# Patient Record
Sex: MALE | Race: BLACK OR AFRICAN AMERICAN | NOT HISPANIC OR LATINO | Employment: FULL TIME | URBAN - METROPOLITAN AREA
[De-identification: names, ages, dates, MRNs, and addresses within clinical notes are randomized per-mention and may not be internally consistent; named-entity substitution may affect disease eponyms.]

---

## 2018-08-21 DIAGNOSIS — Q24.9 ADULT CONGENITAL HEART DISEASE: Primary | ICD-10-CM

## 2018-10-24 ENCOUNTER — OFFICE VISIT (OUTPATIENT)
Dept: INTERNAL MEDICINE | Facility: CLINIC | Age: 28
End: 2018-10-24
Payer: COMMERCIAL

## 2018-10-24 VITALS
HEIGHT: 72 IN | DIASTOLIC BLOOD PRESSURE: 65 MMHG | HEART RATE: 54 BPM | SYSTOLIC BLOOD PRESSURE: 115 MMHG | BODY MASS INDEX: 26.61 KG/M2 | WEIGHT: 196.44 LBS | OXYGEN SATURATION: 94 %

## 2018-10-24 DIAGNOSIS — M25.511 CHRONIC RIGHT SHOULDER PAIN: ICD-10-CM

## 2018-10-24 DIAGNOSIS — M72.2 PLANTAR FASCIITIS OF RIGHT FOOT: Primary | ICD-10-CM

## 2018-10-24 DIAGNOSIS — G89.29 CHRONIC RIGHT SHOULDER PAIN: ICD-10-CM

## 2018-10-24 PROCEDURE — 99999 PR PBB SHADOW E&M-EST. PATIENT-LVL IV: CPT | Mod: PBBFAC,,, | Performed by: STUDENT IN AN ORGANIZED HEALTH CARE EDUCATION/TRAINING PROGRAM

## 2018-10-24 PROCEDURE — 3078F DIAST BP <80 MM HG: CPT | Mod: CPTII,S$GLB,, | Performed by: STUDENT IN AN ORGANIZED HEALTH CARE EDUCATION/TRAINING PROGRAM

## 2018-10-24 PROCEDURE — 99213 OFFICE O/P EST LOW 20 MIN: CPT | Mod: S$GLB,,, | Performed by: STUDENT IN AN ORGANIZED HEALTH CARE EDUCATION/TRAINING PROGRAM

## 2018-10-24 PROCEDURE — 3008F BODY MASS INDEX DOCD: CPT | Mod: CPTII,S$GLB,, | Performed by: STUDENT IN AN ORGANIZED HEALTH CARE EDUCATION/TRAINING PROGRAM

## 2018-10-24 PROCEDURE — 3074F SYST BP LT 130 MM HG: CPT | Mod: CPTII,S$GLB,, | Performed by: STUDENT IN AN ORGANIZED HEALTH CARE EDUCATION/TRAINING PROGRAM

## 2018-10-24 RX ORDER — IBUPROFEN 600 MG/1
600 TABLET ORAL EVERY 6 HOURS PRN
Qty: 15 TABLET | Refills: 0 | Status: SHIPPED | OUTPATIENT
Start: 2018-10-24 | End: 2018-10-29

## 2018-10-24 NOTE — PROGRESS NOTES
Subjective:       Patient ID: Zara Iniguez is a 28 y.o. male.    Chief Complaint: Foot Pain and Shoulder Pain    This is a 29 y/o male with hx of VSD s/p repair in childhood, presents today for urgent visit c/o right heel/shoulder pain.    Right heel pain:  - started 3 months ago, more pressure like at the back of the heel, increased with activity and relived by rest. He has tried hot pads that partially helped. Reports that he recently started playing basket ball. He never had this pain before. Denies any trauma, change in color, joint swelling or deformity.    Right should pain:  - Old complaints (been their for years) he can only describes it as irritation, their is no certain movement that would elicit the pain. He usually play basket ball mainly with right hand.      He was seen by Dr. Fuller in cardiology in 2016, has an appointment with her next month.            Review of Systems   Constitutional: Negative for chills, fatigue and fever.   HENT: Negative for congestion and rhinorrhea.    Eyes: Negative for visual disturbance.   Respiratory: Negative for cough and shortness of breath.    Cardiovascular: Negative for chest pain, palpitations and leg swelling.   Gastrointestinal: Negative for abdominal pain, constipation, diarrhea, nausea and vomiting.   Genitourinary: Negative for dysuria and hematuria.   Musculoskeletal: Negative for arthralgias and joint swelling.   Skin: Negative for rash.   Neurological: Negative for light-headedness and headaches.   Hematological: Does not bruise/bleed easily.       Objective:      Physical Exam   Constitutional: He is oriented to person, place, and time. He appears well-developed and well-nourished.   HENT:   Head: Normocephalic and atraumatic.   Neck: Normal range of motion. Neck supple.   Cardiovascular: Normal rate, regular rhythm and normal heart sounds.   Pulmonary/Chest: Effort normal and breath sounds normal.   Abdominal: Soft. Bowel sounds are normal.    Musculoskeletal: Normal range of motion. He exhibits tenderness (Mid-heel with deep pressure ). He exhibits no edema or deformity.   Neurological: He is alert and oriented to person, place, and time.   Skin: Skin is warm and dry.   Psychiatric: He has a normal mood and affect. His behavior is normal.       Assessment:       1. Plantar fasciitis of right foot    2. Chronic right shoulder pain        Plan:     1- Plantar fasciitis of right would be one of other differntial such as heel contusion, would recommend treatment with short course of NSAID with ice packs and muscle stretching before exercise. Also, importance of wearing comfortable footwear.    2- Shoulder pain, pt has Full ROM, advised to stretching before physical exorcise, monitor the irritation and come back if sometimes worsen.    3- Repaired VSD, has an appointment with cardiology in 11/2018.    4- he is up to date with his vaccination.      RTC prn, 1 year.            Rosalba Alvarez MD  Internal Medicine  PGY-2

## 2018-10-24 NOTE — PATIENT INSTRUCTIONS
Ankle Dorsiflexion/Plantarflexion (Flexibility)    1. Sit on the floor or in bed with your legs straight in front of you.  2. Point both feet. Then flex both feet.  3. Do this 10 to 30 times in a row.  4. Repeat this exercise 2 times a day, or as instructed.  Date Last Reviewed: 5/1/2016 © 2000-2017 [employer]. [address] [place] [address]. All rights reserved. This information is not intended as a substitute for professional medical care. Always follow your healthcare professional's instructions.        Plantar Fasciitis  Plantar fasciitis is a painful swelling of the plantar fascia. The plantar fascia is a thick, fibrous layer of tissue. It covers the bones on the bottom of your foot. And it supports the foot bones in an arched position.  This can happen gradually or suddenly. It usually affects one foot at a time. Heel pain can be sharp, like a knife sticking into the bottom of your foot. You may feel pain after exercising, long-distance jogging, stair climbing, long periods of standing, or after standing up.  Risk factors include: non-active lifestyle, arthritis, diabetes, obesity or recent weight gain, flat foot, high arch. Wearing high heels, loose shoes, or shoes with poor arch support for long periods of time adds to the risk. This problem is commonly found in runners and dancers. It also found in people who stand on hard surfaces for long periods of time.  Foot pain from this condition is usually worse in the morning. But it improves with walking. By the end of the day there may be a dull aching. Treatment requires short-term rest and controlling swelling. It may take up to 9 months before all symptoms go away. Rarely, a steroid injection into the foot, or surgery, may be needed.  Home care  · If you are overweight, lose weight to help healing.  · Choose supportive shoes with good arch support and shock absorbency. Replace athletic shoes when they become worn out. Dont walk  or run barefoot.  · Premade or custom-fitted shoe inserts may be helpful. Inserts made of silicone seem to be the most effective. Custom-made inserts can be provided by a podiatrist or foot specialist, physical therapist, or orthopedist.  · Premade or custom-made night splints keep the heel stretched out while you sleep. They may prevent morning pain.  · Avoid activities that stress the feet: jogging, prolonged standing or walking, contact sports, etc.  · First thing in the morning and before sports, stretch the bottom of your feet. Gently flex your ankle so the toes move toward your knee.  · Icing may help control heel pain. Apply an ice pack to the heel for 10-20 minutes as a preventive. Or ice your heel after a severe flare-up of symptoms. You may repeat this every 1-2 hours as needed.  · You may use over-the-counter pain medicine to control pain, unless another medicine was prescribed. Anti-inflammatory pain medicines, such as ibuprofen or naproxen, may work better than acetaminophen. If you have chronic liver or kidney disease or ever had a stomach ulcer or GI bleeding, talk with your healthcare provider before using these medicines.  Follow-up care  Follow up with your healthcare provider, physical therapist, or podiatrist or foot specialist as advised.  Call for an appointment if pain worsens or there is no relief after a few weeks of home treatment. Shoe inserts, a night splint, or a special boot may be required.  If X-rays were taken, you will be told of any new findings that may affect your care.  When to seek medical advice  Call your healthcare provider right away if any of these occur:  · Foot swelling  · Redness with increasing pain  Date Last Reviewed: 11/21/2015  © 5251-2415 [employer]. [address] [place] [address], [place] [address]. All rights reserved. This information is not intended as a substitute for professional medical care. Always follow your healthcare professional's  instructions.

## 2018-10-31 NOTE — PROGRESS NOTES
Preceptor note    Patient's history and physical discussed, please refer to resident physician's note for specific details. Pt seen and examined with resident physician. Medical record reviewed. I agree with resident's assessment and plan. In addition, advised patient to make sure he wears shoes with heel and arch support given his job requires prolonged standing.

## 2018-11-15 ENCOUNTER — OFFICE VISIT (OUTPATIENT)
Dept: CARDIOLOGY | Facility: CLINIC | Age: 28
End: 2018-11-15
Payer: COMMERCIAL

## 2018-11-15 ENCOUNTER — HOSPITAL ENCOUNTER (OUTPATIENT)
Dept: CARDIOLOGY | Facility: CLINIC | Age: 28
Discharge: HOME | End: 2018-11-15
Attending: INTERNAL MEDICINE
Payer: COMMERCIAL

## 2018-11-15 ENCOUNTER — LAB VISIT (OUTPATIENT)
Dept: LAB | Facility: HOSPITAL | Age: 28
End: 2018-11-15
Attending: INTERNAL MEDICINE
Payer: COMMERCIAL

## 2018-11-15 ENCOUNTER — HOSPITAL ENCOUNTER (OUTPATIENT)
Dept: CARDIOLOGY | Facility: CLINIC | Age: 28
Discharge: HOME | End: 2018-11-15
Payer: COMMERCIAL

## 2018-11-15 VITALS
BODY MASS INDEX: 26.82 KG/M2 | SYSTOLIC BLOOD PRESSURE: 117 MMHG | DIASTOLIC BLOOD PRESSURE: 73 MMHG | HEART RATE: 56 BPM | WEIGHT: 198 LBS | HEIGHT: 72 IN | OXYGEN SATURATION: 99 %

## 2018-11-15 VITALS — HEART RATE: 60 BPM

## 2018-11-15 DIAGNOSIS — Q24.9 ADULT CONGENITAL HEART DISEASE: Chronic | ICD-10-CM

## 2018-11-15 DIAGNOSIS — Q21.20 AV CANAL: ICD-10-CM

## 2018-11-15 DIAGNOSIS — Q24.9 ADULT CONGENITAL HEART DISEASE: ICD-10-CM

## 2018-11-15 LAB
ALBUMIN SERPL BCP-MCNC: 4.1 G/DL
ALP SERPL-CCNC: 54 U/L
ALT SERPL W/O P-5'-P-CCNC: 27 U/L
ANION GAP SERPL CALC-SCNC: 7 MMOL/L
ASCENDING AORTA: 3.54 CM
AST SERPL-CCNC: 23 U/L
AV MEAN GRADIENT: 2.69 MMHG
AV PEAK GRADIENT: 3.92 MMHG
AV VALVE AREA: 3.73 CM2
BASOPHILS # BLD AUTO: 0.04 K/UL
BASOPHILS NFR BLD: 0.8 %
BILIRUB SERPL-MCNC: 0.7 MG/DL
BNP SERPL-MCNC: 24 PG/ML
BUN SERPL-MCNC: 9 MG/DL
CALCIUM SERPL-MCNC: 8.9 MG/DL
CHLORIDE SERPL-SCNC: 103 MMOL/L
CHOLEST SERPL-MCNC: 185 MG/DL
CHOLEST/HDLC SERPL: 4.7 {RATIO}
CO2 SERPL-SCNC: 29 MMOL/L
CREAT SERPL-MCNC: 0.9 MG/DL
CV ECHO LV RWT: 0.45 CM
DIFFERENTIAL METHOD: NORMAL
DOP CALC AO PEAK VEL: 0.99 M/S
DOP CALC AO VTI: 24.91 CM
DOP CALC LVOT AREA: 4.19 CM2
DOP CALC LVOT DIAMETER: 2.31 CM
DOP CALC LVOT STROKE VOLUME: 92.99 CM3
DOP CALCLVOT PEAK VEL VTI: 22.2 CM
E WAVE DECELERATION TIME: 237.14 MSEC
E/A RATIO: 1.63
E/E' RATIO: 7.92
ECHO LV POSTERIOR WALL: 1.02 CM (ref 0.6–1.1)
EOSINOPHIL # BLD AUTO: 0 K/UL
EOSINOPHIL NFR BLD: 0.6 %
ERYTHROCYTE [DISTWIDTH] IN BLOOD BY AUTOMATED COUNT: 13 %
EST. GFR  (AFRICAN AMERICAN): >60 ML/MIN/1.73 M^2
EST. GFR  (NON AFRICAN AMERICAN): >60 ML/MIN/1.73 M^2
FRACTIONAL SHORTENING: 28 % (ref 28–44)
GLUCOSE SERPL-MCNC: 87 MG/DL
HCT VFR BLD AUTO: 46.2 %
HDLC SERPL-MCNC: 39 MG/DL
HDLC SERPL: 21.1 %
HGB BLD-MCNC: 15 G/DL
IMM GRANULOCYTES # BLD AUTO: 0.01 K/UL
IMM GRANULOCYTES NFR BLD AUTO: 0.2 %
INTERVENTRICULAR SEPTUM: 1 CM (ref 0.6–1.1)
LA MAJOR: 5.01 CM
LA MINOR: 4.64 CM
LA WIDTH: 3.29 CM
LDLC SERPL CALC-MCNC: 132.8 MG/DL
LEFT ATRIUM SIZE: 3.35 CM
LEFT ATRIUM VOLUME: 45.14 CM3
LEFT INTERNAL DIMENSION IN SYSTOLE: 3.3 CM (ref 2.1–4)
LEFT VENTRICLE DIASTOLIC VOLUME: 95.51 ML
LEFT VENTRICLE SYSTOLIC VOLUME: 44.19 ML
LEFT VENTRICULAR INTERNAL DIMENSION IN DIASTOLE: 4.56 CM (ref 3.5–6)
LEFT VENTRICULAR MASS: 158.74 G
LV LATERAL E/E' RATIO: 6.87
LV SEPTAL E/E' RATIO: 9.36
LYMPHOCYTES # BLD AUTO: 1.5 K/UL
LYMPHOCYTES NFR BLD: 28.9 %
MCH RBC QN AUTO: 30 PG
MCHC RBC AUTO-ENTMCNC: 32.5 G/DL
MCV RBC AUTO: 92 FL
MONOCYTES # BLD AUTO: 0.6 K/UL
MONOCYTES NFR BLD: 10.7 %
MV PEAK A VEL: 0.63 M/S
MV PEAK E VEL: 1.03 M/S
NEUTROPHILS # BLD AUTO: 3 K/UL
NEUTROPHILS NFR BLD: 58.8 %
NONHDLC SERPL-MCNC: 146 MG/DL
NRBC BLD-RTO: 0 /100 WBC
PISA TR MAX VEL: 2.09 M/S
PLATELET # BLD AUTO: 194 K/UL
PMV BLD AUTO: 11 FL
POTASSIUM SERPL-SCNC: 4.1 MMOL/L
PROT SERPL-MCNC: 7.3 G/DL
PULM VEIN S/D RATIO: 0.62
PV PEAK D VEL: 0.71 M/S
PV PEAK S VEL: 0.44 M/S
PV PEAK VELOCITY: 0.92 CM/S
RA MAJOR: 5.48 CM
RA WIDTH: 3.8 CM
RBC # BLD AUTO: 5 M/UL
RV TISSUE DOPPLER FREE WALL SYSTOLIC VELOCITY 1 (APICAL 4 CHAMBER VIEW): 10.06 M/S
SINUS: 3.49 CM
SODIUM SERPL-SCNC: 139 MMOL/L
STJ: 3.28 CM
TDI LATERAL: 0.15
TDI SEPTAL: 0.11
TDI: 0.13
TR MAX PG: 17.47 MMHG
TRICUSPID ANNULAR PLANE SYSTOLIC EXCURSION: 1.91 CM
TRIGL SERPL-MCNC: 66 MG/DL
TSH SERPL DL<=0.005 MIU/L-ACNC: 0.71 UIU/ML
WBC # BLD AUTO: 5.15 K/UL

## 2018-11-15 PROCEDURE — 3008F BODY MASS INDEX DOCD: CPT | Mod: CPTII,S$GLB,, | Performed by: INTERNAL MEDICINE

## 2018-11-15 PROCEDURE — 80061 LIPID PANEL: CPT

## 2018-11-15 PROCEDURE — 85025 COMPLETE CBC W/AUTO DIFF WBC: CPT

## 2018-11-15 PROCEDURE — 36415 COLL VENOUS BLD VENIPUNCTURE: CPT

## 2018-11-15 PROCEDURE — 99214 OFFICE O/P EST MOD 30 MIN: CPT | Mod: S$GLB,,, | Performed by: INTERNAL MEDICINE

## 2018-11-15 PROCEDURE — 93303 ECHO TRANSTHORACIC: CPT | Mod: S$GLB,,, | Performed by: PEDIATRICS

## 2018-11-15 PROCEDURE — 83880 ASSAY OF NATRIURETIC PEPTIDE: CPT

## 2018-11-15 PROCEDURE — 3074F SYST BP LT 130 MM HG: CPT | Mod: CPTII,S$GLB,, | Performed by: INTERNAL MEDICINE

## 2018-11-15 PROCEDURE — 3078F DIAST BP <80 MM HG: CPT | Mod: CPTII,S$GLB,, | Performed by: INTERNAL MEDICINE

## 2018-11-15 PROCEDURE — 99999 PR PBB SHADOW E&M-EST. PATIENT-LVL III: CPT | Mod: PBBFAC,,, | Performed by: INTERNAL MEDICINE

## 2018-11-15 PROCEDURE — 84443 ASSAY THYROID STIM HORMONE: CPT

## 2018-11-15 PROCEDURE — 93005 ELECTROCARDIOGRAM TRACING: CPT | Mod: S$GLB,,, | Performed by: INTERNAL MEDICINE

## 2018-11-15 PROCEDURE — 93010 ELECTROCARDIOGRAM REPORT: CPT | Mod: S$GLB,,, | Performed by: INTERNAL MEDICINE

## 2018-11-15 PROCEDURE — 80053 COMPREHEN METABOLIC PANEL: CPT

## 2018-11-15 NOTE — PROGRESS NOTES
Subjective:   Patient ID:  Zara Iniguez is a 28 y.o. male who presents for follow-up of Adult congenital heart disease (AV canal)      HPIPatient is seen in our Adult Congenital Heart Defect Clinic.  Patient seen 11/11/2016  He works the [employer] - .  No Dizziness.  No palpitions.    Congenital Heart History:  Presumed AV canal defect repaired at 5 moths of age    Other Medical Problems  None      Cardiac Testing:  Echo 11/15/2018  Mildly dilated intrahepatic segment of inferior vena cava returning to right atrium.  Qualitative impression of mild right atrial dilation    Qualitative impression of mild right ventricular hypertrophy with otherwise normal right ventricular structure.  Qualitatively good right ventricular systolic function.  Right ventricular pressure estimated as normal.  No obvious ventricular septal defect demonstrated.    Qualitative impression of mildly dilated main pulmonary artery and the proximal pulmonary branches.  Redundant chordal attachments and poorly defined papillary muscles of the mitral valve with no evidence of stenosis or significant insufficiency.  Left ventricular measurements consistent with concentric left ventricular hypertrophy.  Flattened septal motion with good movement of the left ventricular free wall, shortening fraction measured 27% and ejection fraction estimated 50-55% from apical views.  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  Echo 11/15/2016  CONCLUSIONS     1 - Mildly enlarged right heart with normal RV systolic function.     2 - Suggestion of ventricular patch noted.     3 - Structurally normal PV, trivial pulmonic regurgitation.     4 - Normal Left heart size with normal left ventricular systolic function (EF 55-60%).     5 - Anomalous papillary muscle orientation without evidence of mitral stenosis.     6 - Trivial mitral regurgitation.     7 - Suggestion of goose-neck in  the LVOT with normal aortic valve. No significant valvular stenosis or regurgitation.   Review of Systems   Constitution: Negative for weight gain and weight loss.   Eyes: Negative for blurred vision.   Cardiovascular: Negative for chest pain, claudication, cyanosis, dyspnea on exertion, irregular heartbeat, leg swelling, near-syncope, orthopnea, palpitations, paroxysmal nocturnal dyspnea and syncope.   Respiratory: Negative for cough.    Endocrine: Negative for polydipsia and polyuria.   Hematologic/Lymphatic: Does not bruise/bleed easily.   Skin: Negative for color change and dry skin.   Musculoskeletal: Negative for falls, muscle cramps, muscle weakness and myalgias.   Gastrointestinal: Negative for abdominal pain, change in bowel habit, heartburn, hematemesis and hematochezia.   Neurological: Negative for brief paralysis, focal weakness, headaches, light-headedness and paresthesias.   Psychiatric/Behavioral: Negative for altered mental status.   Allergic/Immunologic: Negative for environmental allergies.         Allergies and current medications updated and reviewed:  Review of patient's allergies indicates:  No Known Allergies  No current outpatient medications on file.     No current facility-administered medications for this visit.        Objective:      /73 (BP Location: Right arm, Patient Position: Sitting, BP Method: Large (Automatic))   Pulse (!) 56   Ht 6' (1.829 m)   Wt 89.8 kg (197 lb 15.6 oz)   SpO2 99%   BMI 26.85 kg/m²     Body surface area is 2.14 meters squared.  Physical Exam   Constitutional: He is oriented to person, place, and time. He appears well-developed and well-nourished.   HENT:   Mouth/Throat: Mucous membranes are normal.   Neck: No hepatojugular reflux and no JVD present.   Cardiovascular: Normal rate and regular rhythm.   Murmur heard.   Early systolic murmur is present with a grade of 1/6 at the upper right sternal border.  Pulses:       Radial pulses are 2+ on the right  side, and 2+ on the left side.        Posterior tibial pulses are 2+ on the right side, and 2+ on the left side.   Pulmonary/Chest: Breath sounds normal.   No Scoliosis or Kyphosis   Abdominal: Soft. Normal appearance and bowel sounds are normal. There is no hepatomegaly. There is no tenderness.   Musculoskeletal:        Right lower leg: He exhibits no edema.        Left lower leg: He exhibits no edema.   No limitations for exercise   Neurological: He is alert and oriented to person, place, and time.   Skin: Skin is warm and dry. No cyanosis. Nails show no clubbing.        Psychiatric: He has a normal mood and affect. His speech is normal and behavior is normal. Judgment and thought content normal. Cognition and memory are normal.       Chemistry        Component Value Date/Time     11/15/2018 1050    K 4.1 11/15/2018 1050     11/15/2018 1050    CO2 29 11/15/2018 1050    BUN 9 11/15/2018 1050    CREATININE 0.9 11/15/2018 1050    GLU 87 11/15/2018 1050        Component Value Date/Time    CALCIUM 8.9 11/15/2018 1050    ALKPHOS 54 (L) 11/15/2018 1050    AST 23 11/15/2018 1050    ALT 27 11/15/2018 1050    BILITOT 0.7 11/15/2018 1050    ESTGFRAFRICA >60.0 11/15/2018 1050    EGFRNONAA >60.0 11/15/2018 1050            Recent Labs   Lab 11/15/18  1050   WHITE BLOOD CELL COUNT 5.15   HEMOGLOBIN 15.0   HEMATOCRIT 46.2   MCV 92   PLATELETS 194   BNP 24   TSH 0.714   CHOLESTEROL 185   HDL 39 L   LDL CHOLESTEROL 132.8   TRIGLYCERIDES 66   HDL/CHOLESTEROL RATIO 21.1                Test(s) Reviewed  I have reviewed the following in detail:  [] Stress test   [] Angiography   [x] Echocardiogram   [x] Labs   [x] Other:  2018 EKG with RBBB and RVH       Assessment: Moderate Complexity Congenital Heart disease with Physiology Stage A.    Adult congenital heart disease  No SBE prophylaxis  Dental follow up    AV canal  Presumed repair. No residual defects. Doing well.     Plan:   Follow-up in about 28 months (around  3/15/2021) for Labs (CMP, Lipids, BNP CBC, TSH) and TM.  1. Continue healthy lifestyles - continue basketball etc.  Orders Placed This Encounter   Procedures    Comprehensive metabolic panel    Lipid panel    CBC auto differential    Brain natriuretic peptide    TSH

## 2018-11-18 PROBLEM — Q21.20 AV CANAL: Status: ACTIVE | Noted: 2018-11-18

## 2019-03-06 ENCOUNTER — OFFICE VISIT (OUTPATIENT)
Dept: PODIATRY | Facility: CLINIC | Age: 29
End: 2019-03-06
Payer: COMMERCIAL

## 2019-03-06 ENCOUNTER — HOSPITAL ENCOUNTER (OUTPATIENT)
Dept: RADIOLOGY | Facility: HOSPITAL | Age: 29
Discharge: HOME | End: 2019-03-06
Attending: PODIATRIST
Payer: COMMERCIAL

## 2019-03-06 VITALS
SYSTOLIC BLOOD PRESSURE: 116 MMHG | DIASTOLIC BLOOD PRESSURE: 72 MMHG | BODY MASS INDEX: 29.35 KG/M2 | WEIGHT: 216.69 LBS | HEIGHT: 72 IN | HEART RATE: 68 BPM

## 2019-03-06 DIAGNOSIS — M72.2 PLANTAR FASCIITIS: Primary | ICD-10-CM

## 2019-03-06 DIAGNOSIS — M72.2 PLANTAR FASCIITIS: ICD-10-CM

## 2019-03-06 DIAGNOSIS — M24.573 EQUINUS CONTRACTURE OF ANKLE: ICD-10-CM

## 2019-03-06 DIAGNOSIS — M79.671 FOOT PAIN, RIGHT: ICD-10-CM

## 2019-03-06 PROCEDURE — 3078F PR MOST RECENT DIASTOLIC BLOOD PRESSURE < 80 MM HG: ICD-10-PCS | Mod: CPTII,S$GLB,, | Performed by: PODIATRIST

## 2019-03-06 PROCEDURE — 3008F PR BODY MASS INDEX (BMI) DOCUMENTED: ICD-10-PCS | Mod: CPTII,S$GLB,, | Performed by: PODIATRIST

## 2019-03-06 PROCEDURE — 3078F DIAST BP <80 MM HG: CPT | Mod: CPTII,S$GLB,, | Performed by: PODIATRIST

## 2019-03-06 PROCEDURE — 29540 PR STRAPPING; ANKLE &/OR FOOT: ICD-10-PCS | Mod: RT,S$GLB,, | Performed by: PODIATRIST

## 2019-03-06 PROCEDURE — 73630 X-RAY EXAM OF FOOT: CPT | Mod: TC,RT

## 2019-03-06 PROCEDURE — 3074F PR MOST RECENT SYSTOLIC BLOOD PRESSURE < 130 MM HG: ICD-10-PCS | Mod: CPTII,S$GLB,, | Performed by: PODIATRIST

## 2019-03-06 PROCEDURE — 99203 OFFICE O/P NEW LOW 30 MIN: CPT | Mod: 25,S$GLB,, | Performed by: PODIATRIST

## 2019-03-06 PROCEDURE — 99999 PR PBB SHADOW E&M-EST. PATIENT-LVL III: CPT | Mod: PBBFAC,,, | Performed by: PODIATRIST

## 2019-03-06 PROCEDURE — 99203 PR OFFICE/OUTPT VISIT, NEW, LEVL III, 30-44 MIN: ICD-10-PCS | Mod: 25,S$GLB,, | Performed by: PODIATRIST

## 2019-03-06 PROCEDURE — 73630 XR FOOT COMPLETE 3 VIEW RIGHT: ICD-10-PCS | Mod: 26,RT,, | Performed by: RADIOLOGY

## 2019-03-06 PROCEDURE — 3074F SYST BP LT 130 MM HG: CPT | Mod: CPTII,S$GLB,, | Performed by: PODIATRIST

## 2019-03-06 PROCEDURE — 73630 X-RAY EXAM OF FOOT: CPT | Mod: 26,RT,, | Performed by: RADIOLOGY

## 2019-03-06 PROCEDURE — 3008F BODY MASS INDEX DOCD: CPT | Mod: CPTII,S$GLB,, | Performed by: PODIATRIST

## 2019-03-06 PROCEDURE — 99999 PR PBB SHADOW E&M-EST. PATIENT-LVL III: ICD-10-PCS | Mod: PBBFAC,,, | Performed by: PODIATRIST

## 2019-03-06 PROCEDURE — 29540 STRAPPING ANKLE &/FOOT: CPT | Mod: RT,S$GLB,, | Performed by: PODIATRIST

## 2019-03-06 RX ORDER — MELOXICAM 15 MG/1
15 TABLET ORAL DAILY
Qty: 30 TABLET | Refills: 0 | Status: SHIPPED | OUTPATIENT
Start: 2019-03-06 | End: 2020-02-12

## 2019-03-06 NOTE — PROGRESS NOTES
Subjective:      Patient ID: Zara Iniguez is a 28 y.o. male.    Chief Complaint: Foot Problem (right ft./ plantar fasciitis) and Foot Pain    Sharp deep pain bottom right heel.  Gradual onset, worsening over past several weeks, aggravated by increased weight bearing, shoe gear, pressure.  No previous medical treatment.  OTC pain med not helping. Denies trauma, surgery.    Review of Systems   Constitution: Negative for chills, diaphoresis, fever, malaise/fatigue and night sweats.   Cardiovascular: Negative for claudication, cyanosis, leg swelling and syncope.   Skin: Negative for color change, dry skin, nail changes, rash, suspicious lesions and unusual hair distribution.   Musculoskeletal: Negative for falls, joint pain, joint swelling, muscle cramps, muscle weakness and stiffness.   Gastrointestinal: Negative for constipation, diarrhea, nausea and vomiting.   Neurological: Negative for brief paralysis, disturbances in coordination, focal weakness, numbness, paresthesias, sensory change and tremors.           Objective:      Physical Exam   Constitutional: He is oriented to person, place, and time. He appears well-developed and well-nourished. He is cooperative. No distress.   Cardiovascular:   Pulses:       Popliteal pulses are 2+ on the right side, and 2+ on the left side.        Dorsalis pedis pulses are 2+ on the right side, and 2+ on the left side.        Posterior tibial pulses are 2+ on the right side, and 2+ on the left side.   Capillary refill 3 seconds all toes/distal feet, all toes/both feet warm to touch.      Negative lymphadenopathy bilateral popliteal fossa and tarsal tunnel.      Negavie lower extremity edema bilateral.     Musculoskeletal:        Right ankle: He exhibits normal range of motion, no swelling, no ecchymosis, no deformity, no laceration and normal pulse. Achilles tendon normal. Achilles tendon exhibits no pain, no defect and normal Juan's test results.   Sharp deep pain to  palpation inferior heel right at medial calcaneal tubercle without ecchymosis, erythema, edema, or cardinal signs infection, and no signs of trauma.    Ankle dorsiflexion decreased at <10 degrees bilateral with moderate increase with knee flexion bilateral.    Otherwise, Normal angle, base, station of gait. All ten toes without clubbing, cyanosis, or signs of ischemia.  No pain to palpation bilateral lower extremities.  Range of motion, stability, muscle strength, and muscle tone normal bilateral feet and legs.    Lymphadenopathy: No inguinal adenopathy noted on the right or left side.   Negative lymphadenopathy bilateral popliteal fossa and tarsal tunnel.    Negative lymphangitic streaking bilateral feet/ankles/legs.   Neurological: He is alert and oriented to person, place, and time. He has normal strength. He displays no atrophy and no tremor. No sensory deficit. He exhibits normal muscle tone. Gait normal.   Reflex Scores:       Patellar reflexes are 2+ on the right side and 2+ on the left side.       Achilles reflexes are 2+ on the right side and 2+ on the left side.  Negative tinel sign to percussion sural, superficial peroneal, deep peroneal, saphenous, and posterior tibial nerves right and left ankles and feet.     Skin: Skin is warm, dry and intact. Capillary refill takes 2 to 3 seconds. No abrasion, no bruising, no burn, no ecchymosis, no laceration, no lesion and no rash noted. He is not diaphoretic. No cyanosis or erythema. No pallor. Nails show no clubbing.     Skin is normal age and health appropriate color, turgor, texture, and temperature bilateral lower extremities without ulceration, hyperpigmentation, discoloration, masses nodules or cords palpated.  No ecchymosis, erythema, edema, or cardinal signs of infection bilateral lower extremities.     Psychiatric: He has a normal mood and affect.             Assessment:       Encounter Diagnoses   Name Primary?    Plantar fasciitis Yes    Foot pain,  right     Equinus contracture of ankle          Plan:       Zara was seen today for foot problem and foot pain.    Diagnoses and all orders for this visit:    Plantar fasciitis  -     X-Ray Foot Complete Right; Future    Foot pain, right  -     X-Ray Foot Complete Right; Future    Equinus contracture of ankle  -     X-Ray Foot Complete Right; Future    Other orders  -     meloxicam (MOBIC) 15 MG tablet; Take 1 tablet (15 mg total) by mouth once daily.      I counseled the patient on his conditions, their implications and medical management.        Patient will stretch the tendo achilles complex three times daily as demonstrated in the office.  Literature was dispensed illustrating proper stretching technique.    I applied a plantar rest strapping to the patient's right foot to offload symptomatic area, support the arch, and relieve pain.    Patient will obtain over the counter arch supports and wear them in shoes whenever possible.  Athletic shoes intended for walking or running are usually best.    The patient was advised that NSAID-type medications have two very important potential side effects: gastrointestinal irritation including hemorrhage and renal injuries. He was asked to take the medication with food and to stop if he experiences any GI upset. I asked him to call for vomiting, abdominal pain or black/bloody stools. The patient expresses understanding of these issues and questions were answered.    Discussed conservative treatment with shoes of adequate dimensions, material, and style to alleviate symptoms and delay or prevent surgical intervention.    Rx xrays, meloxicam, night splint dispensed.          Follow-up in about 1 month (around 4/6/2019).

## 2019-04-03 ENCOUNTER — OFFICE VISIT (OUTPATIENT)
Dept: PODIATRY | Facility: CLINIC | Age: 29
End: 2019-04-03
Payer: COMMERCIAL

## 2019-04-03 VITALS
SYSTOLIC BLOOD PRESSURE: 124 MMHG | WEIGHT: 216 LBS | HEIGHT: 72 IN | HEART RATE: 73 BPM | DIASTOLIC BLOOD PRESSURE: 70 MMHG | BODY MASS INDEX: 29.26 KG/M2

## 2019-04-03 DIAGNOSIS — M79.671 FOOT PAIN, RIGHT: ICD-10-CM

## 2019-04-03 DIAGNOSIS — M72.2 PLANTAR FASCIITIS: Primary | ICD-10-CM

## 2019-04-03 DIAGNOSIS — M24.573 EQUINUS CONTRACTURE OF ANKLE: ICD-10-CM

## 2019-04-03 PROCEDURE — 3008F PR BODY MASS INDEX (BMI) DOCUMENTED: ICD-10-PCS | Mod: CPTII,S$GLB,, | Performed by: PODIATRIST

## 2019-04-03 PROCEDURE — 99999 PR PBB SHADOW E&M-EST. PATIENT-LVL III: ICD-10-PCS | Mod: PBBFAC,,, | Performed by: PODIATRIST

## 2019-04-03 PROCEDURE — 3074F PR MOST RECENT SYSTOLIC BLOOD PRESSURE < 130 MM HG: ICD-10-PCS | Mod: CPTII,S$GLB,, | Performed by: PODIATRIST

## 2019-04-03 PROCEDURE — 3074F SYST BP LT 130 MM HG: CPT | Mod: CPTII,S$GLB,, | Performed by: PODIATRIST

## 2019-04-03 PROCEDURE — 99212 OFFICE O/P EST SF 10 MIN: CPT | Mod: S$GLB,,, | Performed by: PODIATRIST

## 2019-04-03 PROCEDURE — 99212 PR OFFICE/OUTPT VISIT, EST, LEVL II, 10-19 MIN: ICD-10-PCS | Mod: S$GLB,,, | Performed by: PODIATRIST

## 2019-04-03 PROCEDURE — 3008F BODY MASS INDEX DOCD: CPT | Mod: CPTII,S$GLB,, | Performed by: PODIATRIST

## 2019-04-03 PROCEDURE — 99999 PR PBB SHADOW E&M-EST. PATIENT-LVL III: CPT | Mod: PBBFAC,,, | Performed by: PODIATRIST

## 2019-04-03 PROCEDURE — 3078F DIAST BP <80 MM HG: CPT | Mod: CPTII,S$GLB,, | Performed by: PODIATRIST

## 2019-04-03 PROCEDURE — 3078F PR MOST RECENT DIASTOLIC BLOOD PRESSURE < 80 MM HG: ICD-10-PCS | Mod: CPTII,S$GLB,, | Performed by: PODIATRIST

## 2019-04-03 NOTE — PROGRESS NOTES
Subjective:      Patient ID: Zara Iniguez is a 29 y.o. male.    Chief Complaint: Follow-up (right foot)    Sharp deep pain bottom right heel.  Gradual onset, worsening over past several weeks, aggravated by increased weight bearing, shoe gear, pressure.  Good improvement with stretches,inserts,athletic shoes, night splint, nsaid. Denies trauma, surgery.  xrays negative acute injury.    Review of Systems   Constitution: Negative for chills, diaphoresis, fever, malaise/fatigue and night sweats.   Cardiovascular: Negative for claudication, cyanosis, leg swelling and syncope.   Skin: Negative for color change, dry skin, nail changes, rash, suspicious lesions and unusual hair distribution.   Musculoskeletal: Negative for falls, joint pain, joint swelling, muscle cramps, muscle weakness and stiffness.   Gastrointestinal: Negative for constipation, diarrhea, nausea and vomiting.   Neurological: Negative for brief paralysis, disturbances in coordination, focal weakness, numbness, paresthesias, sensory change and tremors.           Objective:      Physical Exam   Constitutional: He is oriented to person, place, and time. He appears well-developed and well-nourished. He is cooperative. No distress.   Cardiovascular:   Pulses:       Popliteal pulses are 2+ on the right side, and 2+ on the left side.        Dorsalis pedis pulses are 2+ on the right side, and 2+ on the left side.        Posterior tibial pulses are 2+ on the right side, and 2+ on the left side.   Capillary refill 3 seconds all toes/distal feet, all toes/both feet warm to touch.      Negative lymphadenopathy bilateral popliteal fossa and tarsal tunnel.      Negavie lower extremity edema bilateral.     Musculoskeletal:        Right ankle: He exhibits normal range of motion, no swelling, no ecchymosis, no deformity, no laceration and normal pulse. Achilles tendon normal. Achilles tendon exhibits no pain, no defect and normal Juan's test results.   No current  pain to palpation inferior heel right at medial calcaneal tubercle without ecchymosis, erythema, edema, or cardinal signs infection, and no signs of trauma.    Ankle dorsiflexion decreased at <10 degrees bilateral with moderate increase with knee flexion bilateral.    Otherwise, Normal angle, base, station of gait. All ten toes without clubbing, cyanosis, or signs of ischemia.  No pain to palpation bilateral lower extremities.  Range of motion, stability, muscle strength, and muscle tone normal bilateral feet and legs.    Lymphadenopathy: No inguinal adenopathy noted on the right or left side.   Negative lymphadenopathy bilateral popliteal fossa and tarsal tunnel.    Negative lymphangitic streaking bilateral feet/ankles/legs.   Neurological: He is alert and oriented to person, place, and time. He has normal strength. He displays no atrophy and no tremor. No sensory deficit. He exhibits normal muscle tone. Gait normal.   Reflex Scores:       Patellar reflexes are 2+ on the right side and 2+ on the left side.       Achilles reflexes are 2+ on the right side and 2+ on the left side.  Negative tinel sign to percussion sural, superficial peroneal, deep peroneal, saphenous, and posterior tibial nerves right and left ankles and feet.     Skin: Skin is warm, dry and intact. Capillary refill takes 2 to 3 seconds. No abrasion, no bruising, no burn, no ecchymosis, no laceration, no lesion and no rash noted. He is not diaphoretic. No cyanosis or erythema. No pallor. Nails show no clubbing.     Skin is normal age and health appropriate color, turgor, texture, and temperature bilateral lower extremities without ulceration, hyperpigmentation, discoloration, masses nodules or cords palpated.  No ecchymosis, erythema, edema, or cardinal signs of infection bilateral lower extremities.     Psychiatric: He has a normal mood and affect.             Assessment:       Encounter Diagnoses   Name Primary?    Plantar fasciitis Yes    Foot  pain, right     Equinus contracture of ankle          Plan:       Zara was seen today for follow-up.    Diagnoses and all orders for this visit:    Plantar fasciitis    Foot pain, right    Equinus contracture of ankle      I counseled the patient on his conditions, their implications and medical management.        Continue stretches,inserts,athletic shoes night splint, prn nsaid.    Activity to tolerance with gradual return to pre-symptom activity level.    Wants scheduled f/u despite improvement.          Follow up in about 6 weeks (around 5/15/2019).

## 2020-02-10 ENCOUNTER — TELEPHONE (OUTPATIENT)
Dept: PODIATRY | Facility: CLINIC | Age: 30
End: 2020-02-10

## 2020-02-10 ENCOUNTER — PATIENT MESSAGE (OUTPATIENT)
Dept: PODIATRY | Facility: CLINIC | Age: 30
End: 2020-02-10

## 2020-02-10 DIAGNOSIS — M25.571 ACUTE RIGHT ANKLE PAIN: Primary | ICD-10-CM

## 2020-02-10 NOTE — TELEPHONE ENCOUNTER
Put in X-ray order and scheduled pt X-ray before his visit with Dr. Galindo on 2/12. Sent a MO message informing pt of appointment.

## 2020-02-10 NOTE — TELEPHONE ENCOUNTER
----- Message from Shira Gonzales sent at 2/10/2020  6:49 AM CST -----  Contact: pt sent message via [facility]  Appointment Request From: Zara Iniguez    With Provider: Podiatrists    Preferred Date Range: 2/10/2020 - 2/10/2020    Preferred Times: Any time    Reason for visit: Sprained ankle    Comments:  Sprained ankle

## 2020-02-12 ENCOUNTER — APPOINTMENT (OUTPATIENT)
Dept: RADIOLOGY | Facility: OTHER | Age: 30
End: 2020-02-12
Attending: PODIATRIST
Payer: COMMERCIAL

## 2020-02-12 ENCOUNTER — OFFICE VISIT (OUTPATIENT)
Dept: PODIATRY | Facility: CLINIC | Age: 30
End: 2020-02-12
Payer: COMMERCIAL

## 2020-02-12 VITALS
BODY MASS INDEX: 29.26 KG/M2 | SYSTOLIC BLOOD PRESSURE: 140 MMHG | DIASTOLIC BLOOD PRESSURE: 55 MMHG | HEIGHT: 72 IN | HEART RATE: 51 BPM | WEIGHT: 216 LBS

## 2020-02-12 DIAGNOSIS — M25.571 ACUTE RIGHT ANKLE PAIN: ICD-10-CM

## 2020-02-12 DIAGNOSIS — S93.401A SPRAIN OF RIGHT ANKLE, UNSPECIFIED LIGAMENT, INITIAL ENCOUNTER: Primary | ICD-10-CM

## 2020-02-12 DIAGNOSIS — M24.573 EQUINUS CONTRACTURE OF ANKLE: ICD-10-CM

## 2020-02-12 PROCEDURE — 99999 PR PBB SHADOW E&M-EST. PATIENT-LVL III: ICD-10-PCS | Mod: PBBFAC,,, | Performed by: PODIATRIST

## 2020-02-12 PROCEDURE — 3008F PR BODY MASS INDEX (BMI) DOCUMENTED: ICD-10-PCS | Mod: CPTII,S$GLB,, | Performed by: PODIATRIST

## 2020-02-12 PROCEDURE — 3077F PR MOST RECENT SYSTOLIC BLOOD PRESSURE >= 140 MM HG: ICD-10-PCS | Mod: CPTII,S$GLB,, | Performed by: PODIATRIST

## 2020-02-12 PROCEDURE — 99213 PR OFFICE/OUTPT VISIT, EST, LEVL III, 20-29 MIN: ICD-10-PCS | Mod: 25,S$GLB,, | Performed by: PODIATRIST

## 2020-02-12 PROCEDURE — 29540 PR STRAPPING; ANKLE &/OR FOOT: ICD-10-PCS | Mod: RT,S$GLB,, | Performed by: PODIATRIST

## 2020-02-12 PROCEDURE — 73610 XR ANKLE COMPLETE 3 VIEW RIGHT: ICD-10-PCS | Mod: 26,RT,, | Performed by: RADIOLOGY

## 2020-02-12 PROCEDURE — 99999 PR PBB SHADOW E&M-EST. PATIENT-LVL III: CPT | Mod: PBBFAC,,, | Performed by: PODIATRIST

## 2020-02-12 PROCEDURE — 29540 STRAPPING ANKLE &/FOOT: CPT | Mod: RT,S$GLB,, | Performed by: PODIATRIST

## 2020-02-12 PROCEDURE — 73610 X-RAY EXAM OF ANKLE: CPT | Mod: TC,RT

## 2020-02-12 PROCEDURE — 3008F BODY MASS INDEX DOCD: CPT | Mod: CPTII,S$GLB,, | Performed by: PODIATRIST

## 2020-02-12 PROCEDURE — 99213 OFFICE O/P EST LOW 20 MIN: CPT | Mod: 25,S$GLB,, | Performed by: PODIATRIST

## 2020-02-12 PROCEDURE — 3078F DIAST BP <80 MM HG: CPT | Mod: CPTII,S$GLB,, | Performed by: PODIATRIST

## 2020-02-12 PROCEDURE — 3078F PR MOST RECENT DIASTOLIC BLOOD PRESSURE < 80 MM HG: ICD-10-PCS | Mod: CPTII,S$GLB,, | Performed by: PODIATRIST

## 2020-02-12 PROCEDURE — 3077F SYST BP >= 140 MM HG: CPT | Mod: CPTII,S$GLB,, | Performed by: PODIATRIST

## 2020-02-12 PROCEDURE — 73610 X-RAY EXAM OF ANKLE: CPT | Mod: 26,RT,, | Performed by: RADIOLOGY

## 2020-02-12 RX ORDER — MELOXICAM 15 MG/1
15 TABLET ORAL DAILY
Qty: 30 TABLET | Refills: 0 | Status: SHIPPED | OUTPATIENT
Start: 2020-02-12 | End: 2021-03-11

## 2020-02-12 NOTE — PROGRESS NOTES
Subjective:      Patient ID: Zara Iniguez is a 29 y.o. male.    Chief Complaint: Ankle Pain (Right ankle pain due to basketball injury )    Deep aching pain top/front right ankle.  rapid onset twisting ankle in basketball about 1 week ago, improving past few days, aggravated by increased weight bearing, shoe gear, pressure.  No previous medical treatment.  OTC pain med not helping much.  Denies repeat trauma, surgery right foot/ankle.    Review of Systems   Constitution: Negative for chills, diaphoresis, fever, malaise/fatigue and night sweats.   Cardiovascular: Negative for claudication, cyanosis, leg swelling and syncope.   Skin: Negative for color change, dry skin, nail changes, rash, suspicious lesions and unusual hair distribution.   Musculoskeletal: Positive for joint pain. Negative for falls, joint swelling, muscle cramps, muscle weakness and stiffness.   Gastrointestinal: Negative for constipation, diarrhea, nausea and vomiting.   Neurological: Negative for brief paralysis, disturbances in coordination, focal weakness, numbness, paresthesias, sensory change and tremors.           Objective:      Physical Exam   Constitutional: He is oriented to person, place, and time. He appears well-developed and well-nourished. He is cooperative. No distress.   Cardiovascular:   Pulses:       Popliteal pulses are 2+ on the right side, and 2+ on the left side.        Dorsalis pedis pulses are 2+ on the right side, and 2+ on the left side.        Posterior tibial pulses are 2+ on the right side, and 2+ on the left side.   Capillary refill 3 seconds all toes/distal feet, all toes/both feet warm to touch.      Negative lymphadenopathy bilateral popliteal fossa and tarsal tunnel.      Negavie lower extremity edema bilateral.     Musculoskeletal:        Right ankle: He exhibits normal range of motion, no swelling, no ecchymosis, no deformity, no laceration and normal pulse. Achilles tendon normal. Achilles tendon exhibits no  pain, no defect and normal Juan's test results.   TTP anterior central ankle right at TJ and ankle joint without signs of acute trauma, deformity, or loss of function.    Ankle right has negative anterior drawer sign, negative posterior drawer sign, negative external rotation test, negative squeeze test.    Ankle dorsiflexion decreased at <10 degrees bilateral with moderate increase with knee flexion bilateral.    Otherwise, Normal angle, base, station of gait. All ten toes without clubbing, cyanosis, or signs of ischemia.  No pain to palpation bilateral lower extremities.  Range of motion, stability, muscle strength, and muscle tone normal bilateral feet and legs.    Lymphadenopathy: No inguinal adenopathy noted on the right or left side.   Negative lymphadenopathy bilateral popliteal fossa and tarsal tunnel.    Negative lymphangitic streaking bilateral feet/ankles/legs.   Neurological: He is alert and oriented to person, place, and time. He has normal strength. He displays no atrophy and no tremor. No sensory deficit. He exhibits normal muscle tone. Gait normal.   Reflex Scores:       Patellar reflexes are 2+ on the right side and 2+ on the left side.       Achilles reflexes are 2+ on the right side and 2+ on the left side.  Negative tinel sign to percussion sural, superficial peroneal, deep peroneal, saphenous, and posterior tibial nerves right and left ankles and feet.    Negative allodynia both feet/ankles.   Skin: Skin is warm, dry and intact. Capillary refill takes 2 to 3 seconds. No abrasion, no bruising, no burn, no ecchymosis, no laceration, no lesion and no rash noted. He is not diaphoretic. No cyanosis or erythema. No pallor. Nails show no clubbing.     Skin is normal age and health appropriate color, turgor, texture, and temperature bilateral lower extremities without ulceration, hyperpigmentation, discoloration, masses nodules or cords palpated.  No ecchymosis, erythema, edema, or cardinal signs of  infection bilateral lower extremities.     Psychiatric: He has a normal mood and affect.             Assessment:       Encounter Diagnoses   Name Primary?    Sprain of right ankle, unspecified ligament, initial encounter Yes    Acute right ankle pain     Equinus contracture of ankle          Plan:       Zara was seen today for ankle pain.    Diagnoses and all orders for this visit:    Sprain of right ankle, unspecified ligament, initial encounter  -     X-Ray Foot Complete Right; Future  -     X-Ray Ankle Complete Right; Future    Acute right ankle pain  -     X-Ray Foot Complete Right; Future  -     X-Ray Ankle Complete Right; Future    Equinus contracture of ankle  -     X-Ray Foot Complete Right; Future  -     X-Ray Ankle Complete Right; Future    Other orders  -     meloxicam (MOBIC) 15 MG tablet; Take 1 tablet (15 mg total) by mouth once daily.      I counseled the patient on his conditions, their implications and medical management.    Dispense fx boot right - ambulate to tolerance weaning to shoes when symptoms allow.      RIICE right ankle in neutralposition protecting skin.    The patient was advised that NSAID-type medications have two very important potential side effects: gastrointestinal irritation including hemorrhage and renal injuries. He was asked to take the medication with food and to stop if he experiences any GI upset. I asked him to call for vomiting, abdominal pain or black/bloody stools. The patient expresses understanding of these issues and questions were answered.    Discussed conservative treatment with shoes of adequate dimensions, material, and style to alleviate symptoms and delay or prevent surgical intervention.    Patient will stretch the tendo achilles complex three times daily as demonstrated in the office.  Literature was dispensed illustrating proper stretching technique.    I applied a plantar rest strapping to the patient's right foot to offload symptomatic area, support  the arch, and relieve pain.            Follow up in about 1 month (around 3/12/2020).

## 2020-03-12 ENCOUNTER — APPOINTMENT (OUTPATIENT)
Dept: RADIOLOGY | Facility: OTHER | Age: 30
End: 2020-03-12
Attending: PODIATRIST
Payer: COMMERCIAL

## 2020-03-12 ENCOUNTER — OFFICE VISIT (OUTPATIENT)
Dept: PODIATRY | Facility: CLINIC | Age: 30
End: 2020-03-12
Payer: COMMERCIAL

## 2020-03-12 VITALS
HEIGHT: 72 IN | HEART RATE: 61 BPM | DIASTOLIC BLOOD PRESSURE: 66 MMHG | SYSTOLIC BLOOD PRESSURE: 115 MMHG | WEIGHT: 216 LBS | BODY MASS INDEX: 29.26 KG/M2

## 2020-03-12 DIAGNOSIS — M25.571 ACUTE RIGHT ANKLE PAIN: ICD-10-CM

## 2020-03-12 DIAGNOSIS — M24.573 EQUINUS CONTRACTURE OF ANKLE: ICD-10-CM

## 2020-03-12 DIAGNOSIS — S93.401A SPRAIN OF RIGHT ANKLE, UNSPECIFIED LIGAMENT, INITIAL ENCOUNTER: Primary | ICD-10-CM

## 2020-03-12 DIAGNOSIS — S93.401A SPRAIN OF RIGHT ANKLE, UNSPECIFIED LIGAMENT, INITIAL ENCOUNTER: ICD-10-CM

## 2020-03-12 DIAGNOSIS — M79.671 FOOT PAIN, RIGHT: ICD-10-CM

## 2020-03-12 PROCEDURE — 3078F DIAST BP <80 MM HG: CPT | Mod: CPTII,S$GLB,, | Performed by: PODIATRIST

## 2020-03-12 PROCEDURE — 99999 PR PBB SHADOW E&M-EST. PATIENT-LVL IV: CPT | Mod: PBBFAC,,, | Performed by: PODIATRIST

## 2020-03-12 PROCEDURE — 73630 X-RAY EXAM OF FOOT: CPT | Mod: 26,RT,, | Performed by: RADIOLOGY

## 2020-03-12 PROCEDURE — 3008F PR BODY MASS INDEX (BMI) DOCUMENTED: ICD-10-PCS | Mod: CPTII,S$GLB,, | Performed by: PODIATRIST

## 2020-03-12 PROCEDURE — 3074F PR MOST RECENT SYSTOLIC BLOOD PRESSURE < 130 MM HG: ICD-10-PCS | Mod: CPTII,S$GLB,, | Performed by: PODIATRIST

## 2020-03-12 PROCEDURE — 99213 OFFICE O/P EST LOW 20 MIN: CPT | Mod: S$GLB,,, | Performed by: PODIATRIST

## 2020-03-12 PROCEDURE — 73630 X-RAY EXAM OF FOOT: CPT | Mod: TC,RT

## 2020-03-12 PROCEDURE — 73630 XR FOOT COMPLETE 3 VIEW RIGHT: ICD-10-PCS | Mod: 26,RT,, | Performed by: RADIOLOGY

## 2020-03-12 PROCEDURE — 3008F BODY MASS INDEX DOCD: CPT | Mod: CPTII,S$GLB,, | Performed by: PODIATRIST

## 2020-03-12 PROCEDURE — 99999 PR PBB SHADOW E&M-EST. PATIENT-LVL IV: ICD-10-PCS | Mod: PBBFAC,,, | Performed by: PODIATRIST

## 2020-03-12 PROCEDURE — 3074F SYST BP LT 130 MM HG: CPT | Mod: CPTII,S$GLB,, | Performed by: PODIATRIST

## 2020-03-12 PROCEDURE — 3078F PR MOST RECENT DIASTOLIC BLOOD PRESSURE < 80 MM HG: ICD-10-PCS | Mod: CPTII,S$GLB,, | Performed by: PODIATRIST

## 2020-03-12 PROCEDURE — 99213 PR OFFICE/OUTPT VISIT, EST, LEVL III, 20-29 MIN: ICD-10-PCS | Mod: S$GLB,,, | Performed by: PODIATRIST

## 2020-03-12 NOTE — PROGRESS NOTES
Subjective:      Patient ID: Zara Iniguez is a 29 y.o. male.    Chief Complaint: Foot Pain    Deep aching pain top/front right ankle.  rapid onset twisting ankle in basketball about5 weeks ago - improved well with fx boot, ACE, RIICE, nsaid until new trip/fall last night, no change since, aggravated by increased weight bearing, shoe gear, pressure.  No previous medical treatment.  mobic helps  Denies repeat trauma, surgery right foot/ankle.  Foot xrays today negative bone/joint injury.    Review of Systems   Constitution: Negative for chills, diaphoresis, fever, malaise/fatigue and night sweats.   Cardiovascular: Negative for claudication, cyanosis, leg swelling and syncope.   Skin: Negative for color change, dry skin, nail changes, rash, suspicious lesions and unusual hair distribution.   Musculoskeletal: Positive for joint pain. Negative for falls, joint swelling, muscle cramps, muscle weakness and stiffness.   Gastrointestinal: Negative for constipation, diarrhea, nausea and vomiting.   Neurological: Negative for brief paralysis, disturbances in coordination, focal weakness, numbness, paresthesias, sensory change and tremors.           Objective:      Physical Exam   Constitutional: He is oriented to person, place, and time. He appears well-developed and well-nourished. He is cooperative. No distress.   Cardiovascular:   Pulses:       Popliteal pulses are 2+ on the right side, and 2+ on the left side.        Dorsalis pedis pulses are 2+ on the right side, and 2+ on the left side.        Posterior tibial pulses are 2+ on the right side, and 2+ on the left side.   Capillary refill 3 seconds all toes/distal feet, all toes/both feet warm to touch.      Negative lymphadenopathy bilateral popliteal fossa and tarsal tunnel.      Negavie lower extremity edema bilateral.     Musculoskeletal:        Right ankle: He exhibits normal range of motion, no swelling, no ecchymosis, no deformity, no laceration and normal pulse.  Achilles tendon normal. Achilles tendon exhibits no pain, no defect and normal Juan's test results.   TTP anterior central ankle right at TJ and ankle joint without signs of acute trauma, deformity, or loss of function.    Ankle right has negative anterior drawer sign, negative posterior drawer sign, negative external rotation test, negative squeeze test.    Ankle dorsiflexion decreased at <10 degrees bilateral with moderate increase with knee flexion bilateral.    Otherwise, Normal angle, base, station of gait. All ten toes without clubbing, cyanosis, or signs of ischemia.  No pain to palpation bilateral lower extremities.  Range of motion, stability, muscle strength, and muscle tone normal bilateral feet and legs.    Lymphadenopathy: No inguinal adenopathy noted on the right or left side.   Negative lymphadenopathy bilateral popliteal fossa and tarsal tunnel.    Negative lymphangitic streaking bilateral feet/ankles/legs.   Neurological: He is alert and oriented to person, place, and time. He has normal strength. He displays no atrophy and no tremor. No sensory deficit. He exhibits normal muscle tone. Gait normal.   Reflex Scores:       Patellar reflexes are 2+ on the right side and 2+ on the left side.       Achilles reflexes are 2+ on the right side and 2+ on the left side.  Negative tinel sign to percussion sural, superficial peroneal, deep peroneal, saphenous, and posterior tibial nerves right and left ankles and feet.    Negative allodynia both feet/ankles.   Skin: Skin is warm, dry and intact. Capillary refill takes 2 to 3 seconds. No abrasion, no bruising, no burn, no ecchymosis, no laceration, no lesion and no rash noted. He is not diaphoretic. No cyanosis or erythema. No pallor. Nails show no clubbing.     Skin is normal age and health appropriate color, turgor, texture, and temperature bilateral lower extremities without ulceration, hyperpigmentation, discoloration, masses nodules or cords palpated.   No ecchymosis, erythema, edema, or cardinal signs of infection bilateral lower extremities.     Psychiatric: He has a normal mood and affect.             Assessment:       Encounter Diagnoses   Name Primary?    Sprain of right ankle, unspecified ligament, initial encounter Yes    Equinus contracture of ankle     Foot pain, right     Acute right ankle pain          Plan:       Zara was seen today for foot pain.    Diagnoses and all orders for this visit:    Sprain of right ankle, unspecified ligament, initial encounter  -     X-Ray Ankle Complete Right; Future  -     Ambulatory referral/consult to Physical Therapy; Future    Equinus contracture of ankle  -     X-Ray Ankle Complete Right; Future  -     Ambulatory referral/consult to Physical Therapy; Future    Foot pain, right  -     X-Ray Ankle Complete Right; Future  -     Ambulatory referral/consult to Physical Therapy; Future    Acute right ankle pain  -     X-Ray Ankle Complete Right; Future  -     Ambulatory referral/consult to Physical Therapy; Future      I counseled the patient on his conditions, their implications and medical management.    Continue fx boot right - ambulate to tolerance weaning to shoes when symptoms allow.      Right ankle xrays today - post fall evaluation.    RIICE right ankle in neutralposition protecting skin.    Continue nsaids, stretches, otc inserts.     Rx PT          Follow up in about 6 weeks (around 4/23/2020).

## 2020-03-23 ENCOUNTER — TELEPHONE (OUTPATIENT)
Dept: REHABILITATION | Facility: OTHER | Age: 30
End: 2020-03-23

## 2020-04-06 ENCOUNTER — PATIENT MESSAGE (OUTPATIENT)
Dept: PODIATRY | Facility: CLINIC | Age: 30
End: 2020-04-06

## 2020-04-07 ENCOUNTER — TELEPHONE (OUTPATIENT)
Dept: PODIATRY | Facility: CLINIC | Age: 30
End: 2020-04-07

## 2020-04-07 ENCOUNTER — PATIENT MESSAGE (OUTPATIENT)
Dept: PODIATRY | Facility: CLINIC | Age: 30
End: 2020-04-07

## 2020-04-07 NOTE — TELEPHONE ENCOUNTER
Called pat back and he ask if I can resched his xray appt to an earlier appt, what I did and send a message to his [facility] portal

## 2020-04-07 NOTE — TELEPHONE ENCOUNTER
----- Message from Petrona Devendra sent at 4/7/2020  2:58 PM CDT -----  Contact: MELQUIADES FIGUEROA [[id number]]  Type: Patient Call Back    Who called: MELQUIADES FIGUEROA [[id number]]    What is the request in detail: Patient is requesting a call back. He states that he would like to discuss an xray on his ankle. He states that he would like to get that xray sooner if possible.   Please advise.    Can the clinic reply by [facility]? No    Best call back number: [phone]    Additional Information: N/A

## 2020-04-13 ENCOUNTER — APPOINTMENT (OUTPATIENT)
Dept: RADIOLOGY | Facility: OTHER | Age: 30
End: 2020-04-13
Attending: PODIATRIST
Payer: COMMERCIAL

## 2020-04-13 DIAGNOSIS — M24.573 EQUINUS CONTRACTURE OF ANKLE: ICD-10-CM

## 2020-04-13 DIAGNOSIS — M79.671 FOOT PAIN, RIGHT: ICD-10-CM

## 2020-04-13 DIAGNOSIS — M25.571 ACUTE RIGHT ANKLE PAIN: ICD-10-CM

## 2020-04-13 DIAGNOSIS — S93.401A SPRAIN OF RIGHT ANKLE, UNSPECIFIED LIGAMENT, INITIAL ENCOUNTER: ICD-10-CM

## 2020-04-13 PROCEDURE — 73610 X-RAY EXAM OF ANKLE: CPT | Mod: 26,RT,, | Performed by: RADIOLOGY

## 2020-04-13 PROCEDURE — 73610 XR ANKLE COMPLETE 3 VIEW RIGHT: ICD-10-PCS | Mod: 26,RT,, | Performed by: RADIOLOGY

## 2020-04-13 PROCEDURE — 73610 X-RAY EXAM OF ANKLE: CPT | Mod: TC,RT

## 2020-04-20 ENCOUNTER — CLINICAL SUPPORT (OUTPATIENT)
Dept: REHABILITATION | Facility: OTHER | Age: 30
End: 2020-04-20
Attending: PODIATRIST
Payer: COMMERCIAL

## 2020-04-20 DIAGNOSIS — M25.571 ACUTE RIGHT ANKLE PAIN: ICD-10-CM

## 2020-04-20 DIAGNOSIS — M24.573 EQUINUS CONTRACTURE OF ANKLE: ICD-10-CM

## 2020-04-20 DIAGNOSIS — S93.401A SPRAIN OF RIGHT ANKLE, UNSPECIFIED LIGAMENT, INITIAL ENCOUNTER: ICD-10-CM

## 2020-04-20 DIAGNOSIS — M79.671 FOOT PAIN, RIGHT: ICD-10-CM

## 2020-04-20 DIAGNOSIS — S93.401D SPRAIN OF RIGHT ANKLE, UNSPECIFIED LIGAMENT, SUBSEQUENT ENCOUNTER: ICD-10-CM

## 2020-04-20 PROCEDURE — 97161 PT EVAL LOW COMPLEX 20 MIN: CPT | Mod: PN | Performed by: INTERNAL MEDICINE

## 2020-04-20 PROCEDURE — 97110 THERAPEUTIC EXERCISES: CPT | Mod: PN | Performed by: INTERNAL MEDICINE

## 2020-04-23 ENCOUNTER — OFFICE VISIT (OUTPATIENT)
Dept: PODIATRY | Facility: CLINIC | Age: 30
End: 2020-04-23
Payer: COMMERCIAL

## 2020-04-23 VITALS
SYSTOLIC BLOOD PRESSURE: 122 MMHG | TEMPERATURE: 99 F | HEART RATE: 64 BPM | DIASTOLIC BLOOD PRESSURE: 73 MMHG | WEIGHT: 216 LBS | HEIGHT: 72 IN | BODY MASS INDEX: 29.26 KG/M2

## 2020-04-23 DIAGNOSIS — S93.401A SPRAIN OF RIGHT ANKLE, UNSPECIFIED LIGAMENT, INITIAL ENCOUNTER: Primary | ICD-10-CM

## 2020-04-23 DIAGNOSIS — M24.573 EQUINUS CONTRACTURE OF ANKLE: ICD-10-CM

## 2020-04-23 PROCEDURE — 3078F DIAST BP <80 MM HG: CPT | Mod: CPTII,S$GLB,, | Performed by: PODIATRIST

## 2020-04-23 PROCEDURE — 3074F SYST BP LT 130 MM HG: CPT | Mod: CPTII,S$GLB,, | Performed by: PODIATRIST

## 2020-04-23 PROCEDURE — 99212 PR OFFICE/OUTPT VISIT, EST, LEVL II, 10-19 MIN: ICD-10-PCS | Mod: S$GLB,,, | Performed by: PODIATRIST

## 2020-04-23 PROCEDURE — 99212 OFFICE O/P EST SF 10 MIN: CPT | Mod: S$GLB,,, | Performed by: PODIATRIST

## 2020-04-23 PROCEDURE — 3074F PR MOST RECENT SYSTOLIC BLOOD PRESSURE < 130 MM HG: ICD-10-PCS | Mod: CPTII,S$GLB,, | Performed by: PODIATRIST

## 2020-04-23 PROCEDURE — 3078F PR MOST RECENT DIASTOLIC BLOOD PRESSURE < 80 MM HG: ICD-10-PCS | Mod: CPTII,S$GLB,, | Performed by: PODIATRIST

## 2020-04-23 PROCEDURE — 99999 PR PBB SHADOW E&M-EST. PATIENT-LVL III: ICD-10-PCS | Mod: PBBFAC,,, | Performed by: PODIATRIST

## 2020-04-23 PROCEDURE — 99999 PR PBB SHADOW E&M-EST. PATIENT-LVL III: CPT | Mod: PBBFAC,,, | Performed by: PODIATRIST

## 2020-04-23 PROCEDURE — 3008F PR BODY MASS INDEX (BMI) DOCUMENTED: ICD-10-PCS | Mod: CPTII,S$GLB,, | Performed by: PODIATRIST

## 2020-04-23 PROCEDURE — 3008F BODY MASS INDEX DOCD: CPT | Mod: CPTII,S$GLB,, | Performed by: PODIATRIST

## 2020-04-23 NOTE — PROGRESS NOTES
Subjective:      Patient ID: Zara Iniguez is a 30 y.o. male.    Chief Complaint: Follow-up (Sprain ankle, Right )    Deep aching pain top/front right ankle.  rapid onset twisting ankle in basketball about10 weeks ago - improved well with fx boot, ACE, RIICE, nsaid, Physical therapy begins next week, aggravated by increased weight bearing, shoe gear, pressure.   mobic helps  Denies repeat trauma, surgery right foot/ankle.  Foot xrays today negative bone/joint injury.    Review of Systems   Constitution: Negative for chills, diaphoresis, fever, malaise/fatigue and night sweats.   Cardiovascular: Negative for claudication, cyanosis, leg swelling and syncope.   Skin: Negative for color change, dry skin, nail changes, rash, suspicious lesions and unusual hair distribution.   Musculoskeletal: Positive for joint pain. Negative for falls, joint swelling, muscle cramps, muscle weakness and stiffness.   Gastrointestinal: Negative for constipation, diarrhea, nausea and vomiting.   Neurological: Negative for brief paralysis, disturbances in coordination, focal weakness, numbness, paresthesias, sensory change and tremors.           Objective:      Physical Exam   Constitutional: He is oriented to person, place, and time. He appears well-developed and well-nourished. He is cooperative. No distress.   Cardiovascular:   Pulses:       Popliteal pulses are 2+ on the right side, and 2+ on the left side.        Dorsalis pedis pulses are 2+ on the right side, and 2+ on the left side.        Posterior tibial pulses are 2+ on the right side, and 2+ on the left side.   Capillary refill 3 seconds all toes/distal feet, all toes/both feet warm to touch.      Negative lymphadenopathy bilateral popliteal fossa and tarsal tunnel.      Negavie lower extremity edema bilateral.     Musculoskeletal:        Right ankle: He exhibits normal range of motion, no swelling, no ecchymosis, no deformity, no laceration and normal pulse. Achilles tendon  normal. Achilles tendon exhibits no pain, no defect and normal Juan's test results.   Minimal, residual TTP anterior central ankle right at TNJ and ankle joint (ATFL) without signs of acute trauma, deformity, or loss of function.    Ankle right has negative anterior drawer sign, negative posterior drawer sign, negative external rotation test, negative squeeze test.    Ankle dorsiflexion decreased at <10 degrees bilateral with moderate increase with knee flexion bilateral.    Otherwise, Normal angle, base, station of gait. All ten toes without clubbing, cyanosis, or signs of ischemia.  No pain to palpation bilateral lower extremities.  Range of motion, stability, muscle strength, and muscle tone normal bilateral feet and legs.    Lymphadenopathy: No inguinal adenopathy noted on the right or left side.   Negative lymphadenopathy bilateral popliteal fossa and tarsal tunnel.    Negative lymphangitic streaking bilateral feet/ankles/legs.   Neurological: He is alert and oriented to person, place, and time. He has normal strength. He displays no atrophy and no tremor. No sensory deficit. He exhibits normal muscle tone. Gait normal.   Reflex Scores:       Patellar reflexes are 2+ on the right side and 2+ on the left side.       Achilles reflexes are 2+ on the right side and 2+ on the left side.  Negative tinel sign to percussion sural, superficial peroneal, deep peroneal, saphenous, and posterior tibial nerves right and left ankles and feet.    Negative allodynia both feet/ankles.   Skin: Skin is warm, dry and intact. Capillary refill takes 2 to 3 seconds. No abrasion, no bruising, no burn, no ecchymosis, no laceration, no lesion and no rash noted. He is not diaphoretic. No cyanosis or erythema. No pallor. Nails show no clubbing.     Skin is normal age and health appropriate color, turgor, texture, and temperature bilateral lower extremities without ulceration, hyperpigmentation, discoloration, masses nodules or cords  palpated.  No ecchymosis, erythema, edema, or cardinal signs of infection bilateral lower extremities.     Psychiatric: He has a normal mood and affect.             Assessment:       Encounter Diagnoses   Name Primary?    Sprain of right ankle, unspecified ligament, initial encounter Yes    Equinus contracture of ankle          Plan:       Zara was seen today for follow-up.    Diagnoses and all orders for this visit:    Sprain of right ankle, unspecified ligament, initial encounter    Equinus contracture of ankle      I counseled the patient on his conditions, their implications and medical management.    Discussed conservative treatment with shoes of adequate dimensions, material, and style to alleviate symptoms and delay or prevent surgical intervention.      RIICE right ankle in neutralposition protecting skin.    Continue nsaids, stretches, otc inserts.     Continue PT    Gradual return to full pre injury function as tolerated.          Follow up if symptoms worsen or fail to improve.

## 2020-04-27 ENCOUNTER — CLINICAL SUPPORT (OUTPATIENT)
Dept: REHABILITATION | Facility: OTHER | Age: 30
End: 2020-04-27
Attending: PODIATRIST
Payer: COMMERCIAL

## 2020-04-27 DIAGNOSIS — S93.401D SPRAIN OF UNSPECIFIED LIGAMENT OF RIGHT ANKLE, SUBSEQUENT ENCOUNTER: ICD-10-CM

## 2020-04-27 DIAGNOSIS — M25.571 ACUTE RIGHT ANKLE PAIN: ICD-10-CM

## 2020-04-27 DIAGNOSIS — S93.401A SPRAIN OF RIGHT ANKLE, UNSPECIFIED LIGAMENT, INITIAL ENCOUNTER: Primary | ICD-10-CM

## 2020-04-27 DIAGNOSIS — M24.571 EQUINUS CONTRACTURE OF RIGHT ANKLE: ICD-10-CM

## 2020-04-27 DIAGNOSIS — M79.671 FOOT PAIN, RIGHT: ICD-10-CM

## 2020-04-27 PROCEDURE — 97110 THERAPEUTIC EXERCISES: CPT | Mod: PN

## 2020-04-27 NOTE — PLAN OF CARE
[facility]  Physical Therapy Initial Evaluation    Name: Zara Iniguez  Clinic Number: [id number]    Therapy Diagnosis:   Encounter Diagnoses   Name Primary?    Sprain of right ankle, unspecified ligament, initial encounter     Equinus contracture of ankle     Foot pain, right     Acute right ankle pain     Sprain of right ankle, unspecified ligament, subsequent encounter      Physician: Jae Galindo DPM    Physician Orders: PT Eval and Treat , Modalities prn as indicated     Medical Diagnosis:  S93.401A (ICD-10-CM) - Sprain of right ankle, unspecified ligament, initial encounter M24.573 (ICD-10-CM) - Equinus contracture of ankle M79.671 (ICD-10-CM) - Foot pain, right M25.571 (ICD-10-CM) - Acute right ankle pain     Date of Surgery: NA  Evaluation Date: 4/20/2020  Authorization Period Expiration: 03/13/2022  Plan of Care Certification Period:    6/19/2020  Visit # / Visits authorized: 1/ 1  Visit Date: 4/20/2020    Time In: 2:15 pm   Time Out: 3 pm   Total Billable Time: 45 minutes    Precautions: none    Subjective   Date of onset: Feb 2020  History of current condition - Amir reports: playing  basketball  And spraining R ankle  first time in Feb and then had r/t  Walking without issues. Then re-injured in March R ankle same type of injury with basketball- jumped and landed wrong. The first time floor may have been slippery contributing to his injury. He has been  wearing boot but not consistently. He mostly just wears  It at work. Had boot off for 1 1/2 weeks prior to second injury.      4-5/ 10 with inversion/ eversion. - medial ankle pain  No L ankle injuries in past. May have sprained one ankle a few years back can't remember which it was.       Past Medical History:   Diagnosis Date    Adult congenital heart disease 11/11/2016    VSD (ventricular septal defect)- repaired 11/12/2016     Zara Iniguez  has no past surgical history on file.    Zara has a current medication  list which includes the following prescription(s): meloxicam.    Review of patient's allergies indicates:  No Known Allergies     Imaging   EXAMINATION:  XR ANKLE COMPLETE 3 VIEW RIGHT    CLINICAL HISTORY:  pain; Sprain of unspecified ligament of right ankle, initial encounter    TECHNIQUE:  AP, lateral, and oblique images of the right ankle were performed.    COMPARISON:  Right foot radiograph dated 03/12/2020    FINDINGS:  No acute fracture or dislocation.  Ankle mortise appears symmetric.  Talar dome is maintained.      Impression       As above.         no stairs at home  Except 3 to enter.   Occupation:  [employer]  Walks frequently. No lifting.  Modified  Job duties but cont walking  Prior Level of Function: basketball 1-2 x week;sometimes bike riding, jogging tm almost daily 25- 30 min jogging most of time  Current Level of Function:  no basketball , jogged once but pain with sharp turns, tweak.  Walking around neighborhood, making hospital deliveries . Usually 6K - 10K steps per day     Pain:  Current 0/10, worst 4/10   Location: right ankles     Aggravating Factors:  Inversion- hurts medial ankle  Easing Factors:  rest    Pts goals:  r/t basketball , jogging, biking.    Objective     Wearing boot R foot, MI   Amb MI without boot with decreased step length L , decreased R DF ROM    4/20/2020 MMT:    Hip flex B 5  Hip abd B 5  Hip ext B 5  Knee ext B 5  Knee flex B 5     AROM   Ankle dorsiflexion R - 5 arom, L 5  Ankle plantar flexion R 65 , L 60  Ankle inversion R 30 , L 20  Ankle eversion R 15, L  15        4/20/2020 MMT  Ankle dorsiflexion  R 4, L 5  Ankle plantar flexion R 4, L 4  Ankle inversion R 4, L 5  Ankle eversion R 4, L 5       4/20/2020   Flexibility testing:  HS flex B - 45         4/20/2020  Special tests:    U stance - R 5 sec with sway, L 10 sec     Toy's neg B  Talar tilt neg pain, laxity R > L      4/20/2020  Palpation/ joint mob:   unremarkable        CMS  Impairment/Limitation/Restriction for FOTO ankle Survey    Therapist reviewed FOTO scores for Zara Iniguez on 2020.   FOTO documents entered into [employer] - see Media section.       Category: mobility    Current : 40 disability  Goal: 23 disability  Discharge: NA         TREATMENT   Treatment Time In: 2:45  Treatment Time Out: 3 pm   Total Treatment time separate from Evaluation time:15 min     Amir received therapeutic exercises to develop strength, endurance, ROM, flexibility, posture and core stabilization for 15  minutes including:     Calf stretch 30 sec x 3  Seated  Calf stretch 30 sec x 3 standing  nv   Inv/ ev 20x  Ap 20x  HSS seated 20 sec x 3   Towel squeezes nv  B Heel raises 20x    SLS- nv    Amir received the following manual therapy techniques: Joint mobilizations were applied to the: R ankle  for ap talus minutes, includin sec x 3       Education provided re: ex as awilda     Written Home Exercises Provided: see above  Exercises were reviewed and Amir was able to demonstrate them prior to the end of the session.   Pt received a written copy of exercises to perform at home. Amir demonstrated good  understanding of the education provided.     See EMR under MEDIA/ pt instructions  for exercises given 2020 .  Assessment   Zara is a 30 y.o. male referred to outpatient Physical Therapy with a medical diagnosis of R ankle sprain . Pt presents with pain, functional impairments, decreased rom and strength.    Pt prognosis is Good.   Pt will benefit from skilled outpatient Physical Therapy to address the deficits stated above and in the chart below, provide pt/family education, and to maximize pt's level of independence.      Goals:  Short Term Goals: 2 weeks          1. Decreased R ankle pain to 3/10  Progressing, not met  Long Term Goals: 12 weeks     1. Independent with HEP.  2. Report decreased    R   Ankle  pain  <   / =  1  /10 with adls such as basketball drills, walking  Progressing, not  met  3. Increased MMT  for   R ankle to 5/5     Progressing, not met  4. Increased arom  for  R ankle to wfl  DF to + 5  Progressing, not met  5. Improved FOTO score to 23 to help with r/t basketball Progressing, not met      Plan of care discussed with patient: Yes  Pt's spiritual, cultural and educational needs considered and patient is agreeable to the plan of care and goals as stated below:     Anticipated Barriers for therapy: none    Medical Necessity is demonstrated by the following  History  Co-morbidities and personal factors that may impact the plan of care Co-morbidities:   Previous ankle sprain     Personal Factors:   no deficits     low   Examination  Body Structures and Functions, activity limitations and participation restrictions that may impact the plan of care Body Regions:   lower extremities    Body Systems:    ROM  strength  gait  transfers    Participation Restrictions:   basketball    Activity limitations:   Learning and applying knowledge  no deficits    General Tasks and Commands  no deficits    Communication  no deficits    Mobility  See participation restrictions       Domestic Life  no deficits    Interactions/Relationships  no deficits    Life Areas  no deficits    Community and Social Life  no deficits         low   Clinical Presentation stable and uncomplicated low   Decision Making/ Complexity Score: low   Mod- 1-2  personal factors/ comorbidities, address 3+ elements  High 3+ personal factors/ comorbidities, address 4+ elements, unstable clinical presentation       Plan   Certification Period/Plan of care expiration:see above.      Outpatient Physical Therapy 1 times weekly for 10 weeks to include the following interventions: Gait Training, Manual Therapy, Neuromuscular Re-ed, Self Care, Therapeutic Activites and Therapeutic Exercise, dry needling.     Ryann Boss, PT

## 2020-04-27 NOTE — PROGRESS NOTES
"                            Physical Therapy Daily Treatment Note     Name: Zara ABDALLA Beaman  Clinic Number: [id number]    Therapy Diagnosis:   Encounter Diagnoses   Name Primary?    Sprain of right ankle, unspecified ligament, initial encounter Yes    Equinus contracture of right ankle     Acute right ankle pain     Foot pain, right     Sprain of unspecified ligament of right ankle, subsequent encounter      Physician: Jae Galindo DPM    Visit Date: 4/27/2020  Physician Orders: PT Eval and Treat , Modalities prn as indicated   Medical Diagnosis:    S93.401A (ICD-10-CM) - Sprain of right ankle, unspecified ligament, initial encounter   M24.573 (ICD-10-CM) - Equinus contracture of ankle   M79.671 (ICD-10-CM) - Foot pain, right   M25.571 (ICD-10-CM) - Acute right ankle pain   Date of Surgery: NA  Evaluation Date: 4/20/2020  Authorization Period Expiration: 12/31/2020  Plan of Care Certification Period:    6/19/2020  Visit # / Visits authorized: 2 (1/25)  Visit Date: 4/27/2020    Time In: 1:04 PM  Time Out: 2:08 PM   Total Billable Time: 54 minutes    Precautions: Standard    Subjective   Pt reports: his R ankle is "ok" today. States his ankle is still stiff. States his doctor stated he does not need the boot any more.    He was not fully compliant with home exercise program.  Response to previous treatment: some increased pain  Functional change: no change reported    Pain: 3/10  Location: ankle right    Objective     Zara received therapeutic exercises to develop strength, endurance, ROM and flexibility for 54 minutes including:  Seated Gastroc stretch 3 x 30"   Seated Soleus stretch 3 x 30"  Calf stretch 30 sec x 3 standing   Inv/ ev 20x  AP 20x  HSS on step 3 x 30 sec   Towel squeezes nv  B Heel raises 20x  +SLS 3 x 30"  +SLS on foam 3 x 30"  +trampoline jogging with 3 step posting x 3'    Zara received the following manual therapy techniques: Joint mobilizations were applied to the: R ankle for 0 minutes, " including:  ap talus 20 sec x 3    Amir received cold pack for 10 minutes to R ankle.    Home Exercises Provided and Patient Education Provided     Education provided:       Written Home Exercises Provided: Patient instructed to cont prior HEP.  Exercises were reviewed and Amir was able to demonstrate them prior to the end of the session.  Amir demonstrated good  understanding of the education provided.     See EMR under Patient Instructions for exercises provided prior visit.    Assessment     Will progress to band resistance next visit. Continue with single leg balance and proprioceptive training.    Amir is progressing well towards his goals.   Pt prognosis is Good.     Pt will continue to benefit from skilled outpatient physical therapy to address the deficits listed in the problem list box on initial evaluation, provide pt/family education and to maximize pt's level of independence in the home and community environment.     Pt's spiritual, cultural and educational needs considered and pt agreeable to plan of care and goals.    Anticipated barriers to physical therapy: none    Goals:   Short Term Goals: 2 weeks          1. Decreased R ankle pain to 3/10  Progressing, not met    Long Term Goals: 12 weeks    1. Independent with HEP.  2. Report decreased    R   Ankle  pain  <   / =  1  /10 with adls such as basketball drills, walking  Progressing, not met  3. Increased MMT  for   R ankle to 5/5     Progressing, not met  4. Increased arom  for  R ankle to wfl  DF to + 5  Progressing, not met  5. Improved FOTO score to 23 to help with r/t basketball Progressing, not met         Plan     Continue with ankle strengthening and balance training.    Nils Bacon, PT

## 2020-05-04 ENCOUNTER — CLINICAL SUPPORT (OUTPATIENT)
Dept: REHABILITATION | Facility: OTHER | Age: 30
End: 2020-05-04
Attending: PODIATRIST
Payer: COMMERCIAL

## 2020-05-04 DIAGNOSIS — M79.671 FOOT PAIN, RIGHT: ICD-10-CM

## 2020-05-04 DIAGNOSIS — S93.401D SPRAIN OF UNSPECIFIED LIGAMENT OF RIGHT ANKLE, SUBSEQUENT ENCOUNTER: ICD-10-CM

## 2020-05-04 DIAGNOSIS — M25.571 ACUTE RIGHT ANKLE PAIN: ICD-10-CM

## 2020-05-04 DIAGNOSIS — S93.401A SPRAIN OF RIGHT ANKLE, UNSPECIFIED LIGAMENT, INITIAL ENCOUNTER: ICD-10-CM

## 2020-05-04 DIAGNOSIS — M24.571 EQUINUS CONTRACTURE OF RIGHT ANKLE: ICD-10-CM

## 2020-05-04 PROCEDURE — 97110 THERAPEUTIC EXERCISES: CPT | Mod: PN

## 2020-05-04 NOTE — PROGRESS NOTES
"                            Physical Therapy Daily Treatment Note     Name: Zara ABDALLA Hira  Clinic Number: [id number]    Therapy Diagnosis:   Encounter Diagnoses   Name Primary?    Sprain of right ankle, unspecified ligament, initial encounter     Equinus contracture of right ankle     Acute right ankle pain     Foot pain, right     Sprain of unspecified ligament of right ankle, subsequent encounter      Physician: Jae Galindo DPM    Visit Date: 5/4/2020  Physician Orders: PT Eval and Treat , Modalities prn as indicated   Medical Diagnosis:    S93.401A (ICD-10-CM) - Sprain of right ankle, unspecified ligament, initial encounter   M24.573 (ICD-10-CM) - Equinus contracture of ankle   M79.671 (ICD-10-CM) - Foot pain, right   M25.571 (ICD-10-CM) - Acute right ankle pain   Date of Surgery: NA  Evaluation Date: 4/20/2020  Authorization Period Expiration: 12/31/2020  Plan of Care Certification Period:    6/19/2020  Visit # / Visits authorized: 3 (2/25)  Visit Date: 5/4/2020    Time In: 2:10 PM  Time Out: 3:10 PM   Total Billable Time: 50 minutes    Precautions: Standard    Subjective     Pt reports: being semi compliant with his HEP since "I lost the paper at home." C/c today is stiffness on the top and inside of his foot. No longer wears boot.    He was not fully compliant with home exercise program.  Response to previous treatment: some increased pain  Functional change: no change reported    Pain: 3/10  Location: ankle right    Objective     Zara received therapeutic exercises to develop strength, endurance, ROM and flexibility for 50 minutes including:    Seated Gastroc stretch 3 x 30"   Seated Soleus stretch 3 x 30"  Calf stretch 30 sec x 3 standing   HSS on step 3 x 30 sec     Inv/ ev 20x  AP 20x  +resisted Eversion/Inversion: 30x GTB  Towel squeezes nv  B Heel raises 30x  SLS 3 x 30"  SLS on foam 3 x 30"  +SL deadlift (stationary drinking bird): 20x ea LE  +step downs: 20 x 6" step - ea LE  trampoline " jogging with 3 step posting x 3'    Amir received the following manual therapy techniques: Joint mobilizations were applied to the: R ankle for 0 minutes, including:  ap talus 20 sec x 3    Amir received cold pack for 10 minutes to R ankle.    Home Exercises Provided and Patient Education Provided     Education provided:       Written Home Exercises Provided: Patient instructed to cont prior HEP.  Exercises were reviewed and Valeriar was able to demonstrate them prior to the end of the session.  Amir demonstrated good  understanding of the education provided.     See EMR under Patient Instructions for exercises provided prior visit.    Assessment     New exercises added to promote posterior chain flexibility as well as multidirectional ankle strength. Good tolerance with SLS activities with min-no LOB. VC required to maintain foot in good aligned posture. Poor HS flexibility limits trunk excursion during SL deadlifts. Good tolerance with band-resisted ankle inversion/eversion with fatigue noted laterally.     Valeriar is progressing well towards his goals.   Pt prognosis is Good.     Pt will continue to benefit from skilled outpatient physical therapy to address the deficits listed in the problem list box on initial evaluation, provide pt/family education and to maximize pt's level of independence in the home and community environment.     Pt's spiritual, cultural and educational needs considered and pt agreeable to plan of care and goals.    Anticipated barriers to physical therapy: none    Goals:   Short Term Goals: 2 weeks          1. Decreased R ankle pain to 3/10  Progressing, not met    Long Term Goals: 12 weeks    1. Independent with HEP.  2. Report decreased    R   Ankle  pain  <   / =  1  /10 with adls such as basketball drills, walking  Progressing, not met  3. Increased MMT  for   R ankle to 5/5     Progressing, not met  4. Increased arom  for  R ankle to wfl  DF to + 5  Progressing, not met  5. Improved FOTO  score to 23 to help with r/t basketball Progressing, not met         Plan     Continue with ankle strengthening and balance training.    Gabrielle Ellsworth, PT

## 2020-05-11 ENCOUNTER — CLINICAL SUPPORT (OUTPATIENT)
Dept: REHABILITATION | Facility: OTHER | Age: 30
End: 2020-05-11
Attending: PODIATRIST
Payer: COMMERCIAL

## 2020-05-11 DIAGNOSIS — M79.671 FOOT PAIN, RIGHT: ICD-10-CM

## 2020-05-11 DIAGNOSIS — S93.401D SPRAIN OF UNSPECIFIED LIGAMENT OF RIGHT ANKLE, SUBSEQUENT ENCOUNTER: ICD-10-CM

## 2020-05-11 DIAGNOSIS — S93.401A SPRAIN OF RIGHT ANKLE, UNSPECIFIED LIGAMENT, INITIAL ENCOUNTER: ICD-10-CM

## 2020-05-11 DIAGNOSIS — M25.571 ACUTE RIGHT ANKLE PAIN: ICD-10-CM

## 2020-05-11 DIAGNOSIS — M24.571 EQUINUS CONTRACTURE OF RIGHT ANKLE: ICD-10-CM

## 2020-05-11 PROCEDURE — 97110 THERAPEUTIC EXERCISES: CPT | Mod: PN

## 2020-05-11 NOTE — PROGRESS NOTES
"                            Physical Therapy Daily Treatment Note     Name: Zara Iniguez  Clinic Number: [id number]    Therapy Diagnosis:   Encounter Diagnoses   Name Primary?    Sprain of right ankle, unspecified ligament, initial encounter     Equinus contracture of right ankle     Acute right ankle pain     Foot pain, right     Sprain of unspecified ligament of right ankle, subsequent encounter      Physician: Jae Galindo DPM    Visit Date: 5/11/2020  Physician Orders: PT Eval and Treat , Modalities prn as indicated   Medical Diagnosis:    S93.401A (ICD-10-CM) - Sprain of right ankle, unspecified ligament, initial encounter   M24.573 (ICD-10-CM) - Equinus contracture of ankle   M79.671 (ICD-10-CM) - Foot pain, right   M25.571 (ICD-10-CM) - Acute right ankle pain   Date of Surgery: NA  Evaluation Date: 4/20/2020  Authorization Period Expiration: 12/31/2020  Plan of Care Certification Period:    6/19/2020  Visit # / Visits authorized: 4 (3/25)  Visit Date: 5/11/2020    Time In: 2:00 PM  Time Out: 3:00 PM   Total Billable Time: 50 minutes    Precautions: Standard    Subjective     Pt reports: ankle is more stiff and swollen than before "but I'm not icing it as much."    He was not fully compliant with home exercise program.  Response to previous treatment: some increased pain  Functional change: no change reported    Pain: 3/10  Location: ankle right    Objective     Zara received therapeutic exercises to develop strength, endurance, ROM and flexibility for 50 minutes including:    Upright bike x 5'  Seated Gastroc stretch 3 x 30"   Seated Soleus stretch 3 x 30"  Calf stretch 90 sec x 1 standing   HSS on step 1 x 90 sec     Inv/ ev 20x - NP  AP 20x - NP  Resisted Eversion/Inversion: 4 x 10 x GTB  Towel squeezes nv  B Heel raises 30x - defer  +SL heel raise 25x  SLS 3 x 30"   SLS on foam 3 x 30"  SL deadlift (stationary drinking bird): 20x ea LE - defer  +3 cone taps on SLS 1 x 10  +RB ML static 3x " "30"  +ITIS (SLS on airex) 3 x 30"  step downs: 20 x 6" step - ea LE  trampoline jogging with 3 step posting x 3'    Amir received the following manual therapy techniques: Joint mobilizations were applied to the: R ankle for 0 minutes, including:  ap talus 20 sec x 3    Amir received cold pack for 10 minutes to R ankle.    Home Exercises Provided and Patient Education Provided     Education provided:       Written Home Exercises Provided: Patient instructed to cont prior HEP.  Exercises were reviewed and Amir was able to demonstrate them prior to the end of the session.  Amir demonstrated good  understanding of the education provided.     See EMR under Patient Instructions for exercises provided prior visit.    Assessment     Able to perform SLS x 30" without LOB. Able to progress SLS activities today. Good tolerance but with increased fatigue, difficulty maintaining SLS without increased trunk sway during dynamic movement.    Amir is progressing well towards his goals.   Pt prognosis is Good.     Pt will continue to benefit from skilled outpatient physical therapy to address the deficits listed in the problem list box on initial evaluation, provide pt/family education and to maximize pt's level of independence in the home and community environment.     Pt's spiritual, cultural and educational needs considered and pt agreeable to plan of care and goals.    Anticipated barriers to physical therapy: none    Goals:   Short Term Goals: 2 weeks          1. Decreased R ankle pain to 3/10  Progressing, not met    Long Term Goals: 12 weeks    1. Independent with HEP.  2. Report decreased    R   Ankle  pain  <   / =  1  /10 with adls such as basketball drills, walking  Progressing, not met  3. Increased MMT  for   R ankle to 5/5     Progressing, not met  4. Increased arom  for  R ankle to wfl  DF to + 5  Progressing, not met  5. Improved FOTO score to 23 to help with r/t basketball Progressing, not met         Plan "     Continue with ankle strengthening and balance training.    Gabrielle Ellsworth, PT

## 2020-05-18 ENCOUNTER — CLINICAL SUPPORT (OUTPATIENT)
Dept: REHABILITATION | Facility: OTHER | Age: 30
End: 2020-05-18
Attending: PODIATRIST
Payer: COMMERCIAL

## 2020-05-18 DIAGNOSIS — M24.571 EQUINUS CONTRACTURE OF RIGHT ANKLE: ICD-10-CM

## 2020-05-18 DIAGNOSIS — S93.401A SPRAIN OF RIGHT ANKLE, UNSPECIFIED LIGAMENT, INITIAL ENCOUNTER: ICD-10-CM

## 2020-05-18 DIAGNOSIS — S93.401D SPRAIN OF UNSPECIFIED LIGAMENT OF RIGHT ANKLE, SUBSEQUENT ENCOUNTER: ICD-10-CM

## 2020-05-18 DIAGNOSIS — M25.571 ACUTE RIGHT ANKLE PAIN: ICD-10-CM

## 2020-05-18 DIAGNOSIS — M79.671 FOOT PAIN, RIGHT: ICD-10-CM

## 2020-05-18 PROCEDURE — 97110 THERAPEUTIC EXERCISES: CPT | Mod: PN,CQ

## 2020-05-18 NOTE — PROGRESS NOTES
"                            Physical Therapy Daily Treatment Note     Name: Zara Iniguez  Clinic Number: [id number]    Therapy Diagnosis:   Encounter Diagnoses   Name Primary?    Sprain of right ankle, unspecified ligament, initial encounter     Equinus contracture of right ankle     Acute right ankle pain     Foot pain, right     Sprain of unspecified ligament of right ankle, subsequent encounter      Physician: Jae Galindo DPM    Visit Date: 5/18/2020  Physician Orders: PT Eval and Treat , Modalities prn as indicated   Medical Diagnosis:    S93.401A (ICD-10-CM) - Sprain of right ankle, unspecified ligament, initial encounter   M24.573 (ICD-10-CM) - Equinus contracture of ankle   M79.671 (ICD-10-CM) - Foot pain, right   M25.571 (ICD-10-CM) - Acute right ankle pain   Date of Surgery: NA  Evaluation Date: 4/20/2020  Authorization Period Expiration: 12/31/2020  Plan of Care Certification Period:    6/19/2020  Visit # / Visits authorized: 4 (3/25)  Visit Date: 5/18/2020    Time In: 1:48 PM  Time Out: 3:15 PM   Total Billable Time: 87 minutes    Precautions: Standard    Subjective     Pt reports: no new complaints since previous visit.     He was not fully compliant with home exercise program.  Response to previous treatment: some increased pain  Functional change: no change reported    Pain: 2/10  Location: ankle right    Objective     Zara received therapeutic exercises to develop strength, endurance, ROM and flexibility for --- minutes including:    Upright bike x 5'  Seated Gastroc stretch 3 x 30" single leg w/ strap  Seated Soleus stretch 3 x 30" -np  Calf stretch 90 sec x 1 standing knees ext, 1 x 90" knee flex  HSS on step 1 x 90 sec     Inv/ ev 20x - NP  AP 20x - NP  Resisted Eversion/Inversion: 30 x BTB B   Towel squeezes nv  B Heel raises 20x ea (toe out, toe in)  SL heel raise 25x  SLS 3 x 30" airex B   SLS on foam oval  3 x 30" B ITIS  SL deadlift (stationary drinking bird): 20x ea LE - add 15# KB " "today  3 cone taps on SLS 1 x 10 B   Lateral step 40 ft x 2 GTB VCs to correct hip ER  RB ML static 3x 30" np  step downs: 20 x 6" step - ea LE  trampoline jogging with 3 step posting x 3'    Amir received the following manual therapy techniques: Joint mobilizations were applied to the: R ankle for 0 minutes, including:  ap talus 20 sec x 3    Amir received cold pack for 0 minutes to R ankle.    Home Exercises Provided and Patient Education Provided     Education provided:   None today    Written Home Exercises Provided: Patient instructed to cont prior HEP.  Exercises were reviewed and Amir was able to demonstrate them prior to the end of the session.  Amir demonstrated good  understanding of the education provided.     See EMR under Patient Instructions for exercises provided prior visit.    Assessment     Increased medial/lateral sway during SL dead lifts but demo's improved trunk control with added resistance today. Added soleus stretch today to promote ankle mobility and lowr leg flexibility. Good tolerance but continues to demo's poor soleus and gastroc flexibility.    Zara is progressing well towards his goals.   Pt prognosis is Good.     Pt will continue to benefit from skilled outpatient physical therapy to address the deficits listed in the problem list box on initial evaluation, provide pt/family education and to maximize pt's level of independence in the home and community environment.     Pt's spiritual, cultural and educational needs considered and pt agreeable to plan of care and goals.    Anticipated barriers to physical therapy: none    Goals:   Short Term Goals: 2 weeks          1. Decreased R ankle pain to 3/10  Progressing, not met    Long Term Goals: 12 weeks    1. Independent with HEP.  2. Report decreased    R   Ankle  pain  <   / =  1  /10 with adls such as basketball drills, walking  Progressing, not met  3. Increased MMT  for   R ankle to 5/5     Progressing, not met  4. Increased arom  for "  R ankle to wfl  DF to + 5  Progressing, not met  5. Improved FOTO score to 23 to help with r/t basketball Progressing, not met         Plan     Continue with ankle strengthening and balance training.    Gabrielle Ellsworth, PT   Efren Land, PTA

## 2020-05-25 ENCOUNTER — CLINICAL SUPPORT (OUTPATIENT)
Dept: REHABILITATION | Facility: OTHER | Age: 30
End: 2020-05-25
Attending: PODIATRIST
Payer: COMMERCIAL

## 2020-05-25 DIAGNOSIS — M24.571 EQUINUS CONTRACTURE OF RIGHT ANKLE: ICD-10-CM

## 2020-05-25 DIAGNOSIS — S93.401D SPRAIN OF UNSPECIFIED LIGAMENT OF RIGHT ANKLE, SUBSEQUENT ENCOUNTER: ICD-10-CM

## 2020-05-25 DIAGNOSIS — M79.671 FOOT PAIN, RIGHT: ICD-10-CM

## 2020-05-25 DIAGNOSIS — S93.401A SPRAIN OF RIGHT ANKLE, UNSPECIFIED LIGAMENT, INITIAL ENCOUNTER: ICD-10-CM

## 2020-05-25 DIAGNOSIS — M25.571 ACUTE RIGHT ANKLE PAIN: ICD-10-CM

## 2020-05-25 PROCEDURE — 97110 THERAPEUTIC EXERCISES: CPT | Mod: PN,CQ

## 2020-05-25 NOTE — PROGRESS NOTES
"                            Physical Therapy Daily Treatment Note     Name: Zara Iniguez  Clinic Number: [id number]    Therapy Diagnosis:   Encounter Diagnoses   Name Primary?    Sprain of right ankle, unspecified ligament, initial encounter     Equinus contracture of right ankle     Acute right ankle pain     Foot pain, right     Sprain of unspecified ligament of right ankle, subsequent encounter      Physician: Jae Galindo DPM    Visit Date: 5/25/2020  Physician Orders: PT Eval and Treat , Modalities prn as indicated   Medical Diagnosis:    S93.401A (ICD-10-CM) - Sprain of right ankle, unspecified ligament, initial encounter   M24.573 (ICD-10-CM) - Equinus contracture of ankle   M79.671 (ICD-10-CM) - Foot pain, right   M25.571 (ICD-10-CM) - Acute right ankle pain   Date of Surgery: NA  Evaluation Date: 4/20/2020  Authorization Period Expiration: 12/31/2020  Plan of Care Certification Period:    6/19/2020  Visit # / Visits authorized: 5 (4/25)  Visit Date: 5/25/2020    Time In: 1438   Time Out: 1540  Total Billable Time: 62 minutes    Precautions: Standard    Subjective     Pt states having some soreness and stiffness in R ankle.     He was not fully compliant with home exercise program.  Response to previous treatment: no adverse effects  Functional change: no change     Pain: 2/10  Location: ankle right    Objective     Zara received therapeutic exercises to develop strength, endurance, ROM and flexibility for 62 minutes including:    Upright bike x 5' t increase blood flow   Seated Gastroc stretch 3 x 30" single leg w/ strap  Calf stretch 90 sec x 1 standing knees ext, 1 x 90" knee flex  HSS on step 1 x 90 sec   SL heel raise 25x  SLS 3 x 30" airex B   SLS on foam oval  3 x 30" B ITIS  SL deadlift (stationary drinking bird): 30 x ea LE - add 15# KB today  (pt required several rest breaks 2* quad fatigue)   3 cone taps on SLS 1 x 10 B   SLS plyotoss 3 x 20   Lateral step GTB 40 ft x 4   step downs: 20 x " "6" step - ea LE  trampoline jogging with 3 step posting x 3'  Inv/ ev 20x - NP  AP 20x - NP  Resisted Eversion/Inversion: 30 x BTB B   Towel squeezes nv  B Heel raises 20x ea (toe out, toe in)  Seated Soleus stretch 3 x 30" -np  Lateral step 40 ft x 2 GTB VCs to correct hip ER  RB ML static 3x 30" np      Amir received the following manual therapy techniques: Joint mobilizations were applied to the: R ankle for 0 minutes, including:  ap talus 20 sec x 3    Amir received cold pack for 0 minutes to R ankle.    Home Exercises Provided and Patient Education Provided     Education provided:   Pt edu on proper exercise technique.      Written Home Exercises Provided: Patient instructed to cont prior HEP.  Exercises were reviewed and Amir was able to demonstrate them prior to the end of the session.  Amir demonstrated good  understanding of the education provided.     See EMR under Patient Instructions for exercises provided prior visit.    Assessment   Pt displayed increased strength and balance during therex.  Pt had no adverse effects from tx.  Pt cont to lack some ankle strength and stability in SL activities.        Amir is progressing well towards his goals.   Pt prognosis is Good.     Pt will continue to benefit from skilled outpatient physical therapy to address the deficits listed in the problem list box on initial evaluation, provide pt/family education and to maximize pt's level of independence in the home and community environment.     Pt's spiritual, cultural and educational needs considered and pt agreeable to plan of care and goals.    Anticipated barriers to physical therapy: none    Goals:   Short Term Goals: 2 weeks          1. Decreased R ankle pain to 3/10  Progressing, not met    Long Term Goals: 12 weeks    1. Independent with HEP.  2. Report decreased    R   Ankle  pain  <   / =  1  /10 with adls such as basketball drills, walking  Progressing, not met  3. Increased MMT  for   R ankle to 5/5     " Progressing, not met  4. Increased arom  for  R ankle to wfl  DF to + 5  Progressing, not met  5. Improved FOTO score to 23 to help with r/t basketball Progressing, not met         Plan     Cont to progress towards goals set by Pt focusing on ankle strength and balance.      Efren Land, PTA

## 2020-06-01 NOTE — PROGRESS NOTES
"                            Physical Therapy Daily Treatment Note     Name: Zara ABDALLA Hira  Clinic Number: [id number]    Therapy Diagnosis:   Encounter Diagnoses   Name Primary?    Sprain of right ankle, unspecified ligament, initial encounter     Equinus contracture of right ankle     Acute right ankle pain     Foot pain, right     Sprain of unspecified ligament of right ankle, subsequent encounter      Physician: Jae Galindo DPM    Visit Date: 6/2/2020  Physician Orders: PT Eval and Treat , Modalities prn as indicated   Medical Diagnosis:    S93.401A (ICD-10-CM) - Sprain of right ankle, unspecified ligament, initial encounter   M24.573 (ICD-10-CM) - Equinus contracture of ankle   M79.671 (ICD-10-CM) - Foot pain, right   M25.571 (ICD-10-CM) - Acute right ankle pain   Date of Surgery: NA  Evaluation Date: 4/20/2020  Authorization Period Expiration: 12/31/2020  Plan of Care Certification Period:    6/19/2020  Visit # / Visits authorized: 6 (5/25)  Visit Date: 6/2/2020    Time In: 9:12 AM (pt arrived late)   Time Out: 10:00  Total Billable Time: 48 minutes    Precautions: Standard    Subjective     Pt states no new complaints today.     He was not fully compliant with home exercise program.  Response to previous treatment: no adverse effects  Functional change: no change     Pain: 1- 2/10  Location: ankle right    Objective     Zara received therapeutic exercises to develop strength, endurance, ROM and flexibility for 48 minutes including:    Upright bike x 5' t increase blood flow   Seated Gastroc stretch 3 x 30" single leg w/ strap  Calf stretch 90 sec x 1 standing knees ext, 1 x 90" knee flex  HSS on step 1 x 90 sec   SL heel raise 25x  SLS 3 x 30" airex B   SLS on foam oval  3 x 30" B ITIS  SL deadlift (stationary drinking bird): 30 x ea LE with 15# KB      3 cone taps on SLS 1 x 10 B   SLS plyotoss 3 x 20 with yellow ball  Lateral step GTB 40 ft x 4   step downs: 20 x 6" step - ea LE  trampoline jogging " "with 3 step posting x 3'  Inv/ ev 20x - NP  AP 20x - NP  Resisted Eversion/Inversion: 30 x BTB B   Towel squeezes nv  B Heel raises 20x ea (toe out, toe in)  Seated Soleus stretch 3 x 30" -np  Lateral step 40 ft x 2 GTB VCs to correct hip ER  RB ML static 3x 30" np      Amir received the following manual therapy techniques: Joint mobilizations were applied to the: R ankle for 0 minutes, including:  ap talus 20 sec x 3    Amir received cold pack for 0 minutes to R ankle.    Home Exercises Provided and Patient Education Provided     Education provided:   Pt edu on proper exercise technique.      Written Home Exercises Provided: Patient instructed to cont prior HEP.  Exercises were reviewed and Amir was able to demonstrate them prior to the end of the session.  Amir demonstrated good  understanding of the education provided.     See EMR under Patient Instructions for exercises provided prior visit.    Assessment   Good tolerance to treatment today. Fatigue with SL deadlift, but able to complete.         Amir is progressing well towards his goals.   Pt prognosis is Good.     Pt will continue to benefit from skilled outpatient physical therapy to address the deficits listed in the problem list box on initial evaluation, provide pt/family education and to maximize pt's level of independence in the home and community environment.     Pt's spiritual, cultural and educational needs considered and pt agreeable to plan of care and goals.    Anticipated barriers to physical therapy: none    Goals:   Short Term Goals: 2 weeks          1. Decreased R ankle pain to 3/10  Progressing, not met    Long Term Goals: 12 weeks    1. Independent with HEP.  2. Report decreased    R   Ankle  pain  <   / =  1  /10 with adls such as basketball drills, walking  Progressing, not met  3. Increased MMT  for   R ankle to 5/5     Progressing, not met  4. Increased arom  for  R ankle to wfl  DF to + 5  Progressing, not met  5. Improved FOTO score to " 23 to help with r/t basketball Progressing, not met         Plan     Cont to progress towards goals set by Pt focusing on ankle strength and balance.      Petrona Porras, PT

## 2020-06-02 ENCOUNTER — CLINICAL SUPPORT (OUTPATIENT)
Dept: REHABILITATION | Facility: OTHER | Age: 30
End: 2020-06-02
Attending: PODIATRIST
Payer: COMMERCIAL

## 2020-06-02 DIAGNOSIS — M25.571 ACUTE RIGHT ANKLE PAIN: ICD-10-CM

## 2020-06-02 DIAGNOSIS — M24.571 EQUINUS CONTRACTURE OF RIGHT ANKLE: ICD-10-CM

## 2020-06-02 DIAGNOSIS — S93.401D SPRAIN OF UNSPECIFIED LIGAMENT OF RIGHT ANKLE, SUBSEQUENT ENCOUNTER: ICD-10-CM

## 2020-06-02 DIAGNOSIS — M79.671 FOOT PAIN, RIGHT: ICD-10-CM

## 2020-06-02 DIAGNOSIS — S93.401A SPRAIN OF RIGHT ANKLE, UNSPECIFIED LIGAMENT, INITIAL ENCOUNTER: ICD-10-CM

## 2020-06-02 PROCEDURE — 97110 THERAPEUTIC EXERCISES: CPT | Mod: PN | Performed by: PHYSICAL THERAPIST

## 2020-06-08 ENCOUNTER — CLINICAL SUPPORT (OUTPATIENT)
Dept: REHABILITATION | Facility: OTHER | Age: 30
End: 2020-06-08
Attending: PODIATRIST
Payer: COMMERCIAL

## 2020-06-08 DIAGNOSIS — M79.671 FOOT PAIN, RIGHT: ICD-10-CM

## 2020-06-08 DIAGNOSIS — M25.571 ACUTE RIGHT ANKLE PAIN: ICD-10-CM

## 2020-06-08 DIAGNOSIS — S93.401A SPRAIN OF RIGHT ANKLE, UNSPECIFIED LIGAMENT, INITIAL ENCOUNTER: ICD-10-CM

## 2020-06-08 DIAGNOSIS — S93.401D SPRAIN OF UNSPECIFIED LIGAMENT OF RIGHT ANKLE, SUBSEQUENT ENCOUNTER: ICD-10-CM

## 2020-06-08 DIAGNOSIS — M24.571 EQUINUS CONTRACTURE OF RIGHT ANKLE: ICD-10-CM

## 2020-06-08 PROCEDURE — 97110 THERAPEUTIC EXERCISES: CPT | Mod: PN,CQ

## 2020-06-08 NOTE — PROGRESS NOTES
"                            Physical Therapy Daily Treatment Note     Name: Zara Iniguez  Clinic Number: [id number]    Therapy Diagnosis:   Encounter Diagnoses   Name Primary?    Sprain of right ankle, unspecified ligament, initial encounter     Equinus contracture of right ankle     Acute right ankle pain     Foot pain, right     Sprain of unspecified ligament of right ankle, subsequent encounter      Physician: Jae Galindo DPM    Visit Date: 6/8/2020  Physician Orders: PT Eval and Treat , Modalities prn as indicated   Medical Diagnosis:    S93.401A (ICD-10-CM) - Sprain of right ankle, unspecified ligament, initial encounter   M24.573 (ICD-10-CM) - Equinus contracture of ankle   M79.671 (ICD-10-CM) - Foot pain, right   M25.571 (ICD-10-CM) - Acute right ankle pain   Date of Surgery: NA  Evaluation Date: 4/20/2020  Authorization Period Expiration: 12/31/2020  Plan of Care Certification Period:    6/19/2020  Visit # / Visits authorized: 7 (6/25)  Visit Date: 6/8/2020    Time In: 1400   Time Out: 1505  Total Billable Time: 65 minutes    Precautions: Standard    Subjective     Pt reports w/ 1/10 pn in R ankle today.     He was not fully compliant with home exercise program.  Response to previous treatment: no adverse effects  Functional change: no change     Pain: 1- 2/10  Location: ankle right    Objective     Zara received therapeutic exercises to develop strength, endurance, ROM and flexibility for 65 minutes including:    Upright bike x 5' t0 increase blood flow   Ankle mobe standing w/ sports strap and step 3 x 30 sec   Calf stretch 90 sec x 1 standing knees ext, 1 x 90" knee flex  HSS on step 1 x 90 sec   SL deadlift (stationary drinking bird): x 15 ea LE with 15# KB ( reps lowered 2* muscle fatigue)     3 cone taps on SLS 2 x 6 B   SLS plyotoss 2  x30 with yellow ball on oval pad B   Lateral step GTB 40 ft x 4   step downs: 20 x 6" step - ea LE w/ manual resistance for glute med activation.    SHuttle " "SL press B 20 x ea 50# w/ GTB for glute med activation    Seated Gastroc stretch 3 x 30" single leg w/ strap  SL heel raise 25x  SLS 3 x 30" airex B   SLS on foam oval  3 x 30" B ITIS  trampoline jogging with 3 step posting x 3'  Inv/ ev 20x - NP  AP 20x - NP  Resisted Eversion/Inversion: 30 x BTB B   Towel squeezes nv  B Heel raises 20x ea (toe out, toe in)  Seated Soleus stretch 3 x 30" -np  Lateral step 40 ft x 2 GTB VCs to correct hip ER  RB ML static 3x 30" np      Amir received the following manual therapy techniques: Joint mobilizations were applied to the: R ankle for 0 minutes, including:  ap talus 20 sec x 3    Amir received cold pack for 0 minutes to R ankle.    Home Exercises Provided and Patient Education Provided     Education provided:   Pt edu on proper exercise technique.      Written Home Exercises Provided: Patient instructed to cont prior HEP.  Exercises were reviewed and Amir was able to demonstrate them prior to the end of the session.  Amir demonstrated good  understanding of the education provided.     See EMR under Patient Instructions for exercises provided prior visit.    Assessment     Pt awilda tx well w/ decreased ankle pn after tx.  Pt showed improved balance and strength during therex.  Pt able to correct knee valgus in single leg squat exercises with mild TC.  Ankle mobility improved but cont to limit motion with squat technique.      Amir is progressing well towards his goals.   Pt prognosis is Good.     Pt will continue to benefit from skilled outpatient physical therapy to address the deficits listed in the problem list box on initial evaluation, provide pt/family education and to maximize pt's level of independence in the home and community environment.     Pt's spiritual, cultural and educational needs considered and pt agreeable to plan of care and goals.    Anticipated barriers to physical therapy: none    Goals:   Short Term Goals: 2 weeks          1. Decreased R ankle pain to " 3/10  Progressing, not met    Long Term Goals: 12 weeks    1. Independent with HEP.  2. Report decreased    R   Ankle  pain  <   / =  1  /10 with adls such as basketball drills, walking  Progressing, not met  3. Increased MMT  for   R ankle to 5/5     Progressing, not met  4. Increased arom  for  R ankle to wfl  DF to + 5  Progressing, not met  5. Improved FOTO score to 23 to help with r/t basketball Progressing, not met         Plan     Cont to progress towards goals set by Pt focusing on ankle strength and balance.      Efren Land, PTA

## 2020-06-16 ENCOUNTER — CLINICAL SUPPORT (OUTPATIENT)
Dept: REHABILITATION | Facility: OTHER | Age: 30
End: 2020-06-16
Attending: PODIATRIST
Payer: COMMERCIAL

## 2020-06-16 DIAGNOSIS — M79.671 FOOT PAIN, RIGHT: ICD-10-CM

## 2020-06-16 DIAGNOSIS — M24.571 EQUINUS CONTRACTURE OF RIGHT ANKLE: ICD-10-CM

## 2020-06-16 DIAGNOSIS — S93.401D SPRAIN OF UNSPECIFIED LIGAMENT OF RIGHT ANKLE, SUBSEQUENT ENCOUNTER: ICD-10-CM

## 2020-06-16 DIAGNOSIS — S93.401A SPRAIN OF RIGHT ANKLE, UNSPECIFIED LIGAMENT, INITIAL ENCOUNTER: ICD-10-CM

## 2020-06-16 DIAGNOSIS — M25.571 ACUTE RIGHT ANKLE PAIN: ICD-10-CM

## 2020-06-16 PROCEDURE — 97110 THERAPEUTIC EXERCISES: CPT | Mod: PN

## 2020-06-16 PROCEDURE — 97140 MANUAL THERAPY 1/> REGIONS: CPT | Mod: PN

## 2020-06-16 NOTE — PROGRESS NOTES
"                            Physical Therapy Daily Treatment Note     Name: Zara Iniguez  Clinic Number: [id number]    Therapy Diagnosis:   Encounter Diagnoses   Name Primary?    Sprain of right ankle, unspecified ligament, initial encounter     Equinus contracture of right ankle     Acute right ankle pain     Foot pain, right     Sprain of unspecified ligament of right ankle, subsequent encounter      Physician: Jae Galindo DPM    Visit Date: 6/16/2020  Physician Orders: PT Eval and Treat , Modalities prn as indicated   Medical Diagnosis:    S93.401A (ICD-10-CM) - Sprain of right ankle, unspecified ligament, initial encounter   M24.573 (ICD-10-CM) - Equinus contracture of ankle   M79.671 (ICD-10-CM) - Foot pain, right   M25.571 (ICD-10-CM) - Acute right ankle pain   Date of Surgery: NA  Evaluation Date: 4/20/2020  Authorization Period Expiration: 12/31/2020  Plan of Care Certification Period:    6/19/2020  Visit # / Visits authorized: 8 (7/25)  Visit Date: 6/16/2020    Time In: 1500   Time Out: 1530  Total Billable Time: 30 minutes    Precautions: Standard    Subjective     Pt reports w/ 0/10 pn in R ankle today.     He was not fully compliant with home exercise program.  Response to previous treatment: no adverse effects  Functional change: no change     Pain: 1- 2/10  Location: ankle right    Objective     Zara received therapeutic exercises to develop strength, endurance, ROM and flexibility for 15 minutes including:    Half-kneel self DF mob at wall 30x3" - extensive education provided    Upright bike x 5' t0 increase blood flow   Ankle mobe standing w/ sports strap and step 3 x 30 sec   Calf stretch 90 sec x 1 standing knees ext, 1 x 90" knee flex  HSS on step 1 x 90 sec   SL deadlift (stationary drinking bird): x 15 ea LE with 15# KB ( reps lowered 2* muscle fatigue)     3 cone taps on SLS 2 x 6 B   SLS plyotoss 2  x30 with yellow ball on oval pad B   Lateral step GTB 40 ft x 4   step downs: 20 x 6" " "step - ea LE w/ manual resistance for glute med activation.    SHuttle SL press B 20 x ea 50# w/ GTB for glute med activation    Seated Gastroc stretch 3 x 30" single leg w/ strap  SL heel raise 25x  SLS 3 x 30" airex B   SLS on foam oval  3 x 30" B ITIS  trampoline jogging with 3 step posting x 3'  Inv/ ev 20x - NP  AP 20x - NP  Resisted Eversion/Inversion: 30 x BTB B   Towel squeezes nv  B Heel raises 20x ea (toe out, toe in)  Seated Soleus stretch 3 x 30" -np  Lateral step 40 ft x 2 GTB VCs to correct hip ER  RB ML static 3x 30" np      Amir received the following manual therapy techniques: Joint mobilizations were applied to the: R ankle for 15 minutes, including:  Talocrural gapping HVLAT   Calf srtetch  Palpation assessment     Amir received cold pack for 0 minutes to R ankle.    Home Exercises Provided and Patient Education Provided     Education provided:   Pt edu on proper exercise technique.      Written Home Exercises Provided: Patient instructed to cont prior HEP.  Exercises were reviewed and Amir was able to demonstrate them prior to the end of the session.  Amir demonstrated good  understanding of the education provided.     See EMR under Patient Instructions for exercises provided prior visit.    Assessment     Pt shows severe restriciton with DF and ankle joint mobility. Pt edu on updated HEP and how current deficits affect recurrent rate, squat ability, and basketball perfromance. progrss as awilda.       Amir is progressing well towards his goals.   Pt prognosis is Good.     Pt will continue to benefit from skilled outpatient physical therapy to address the deficits listed in the problem list box on initial evaluation, provide pt/family education and to maximize pt's level of independence in the home and community environment.     Pt's spiritual, cultural and educational needs considered and pt agreeable to plan of care and goals.    Anticipated barriers to physical therapy: none    Goals:   Short " Term Goals: 2 weeks          1. Decreased R ankle pain to 3/10  Progressing, not met    Long Term Goals: 12 weeks    1. Independent with HEP.  2. Report decreased    R   Ankle  pain  <   / =  1  /10 with adls such as basketball drills, walking  Progressing, not met  3. Increased MMT  for   R ankle to 5/5     Progressing, not met  4. Increased arom  for  R ankle to wfl  DF to + 5  Progressing, not met  5. Improved FOTO score to 23 to help with r/t basketball Progressing, not met         Plan     Cont to progress towards goals set by Pt focusing on ankle strength and balance.      Olivier Garcia, PT

## 2020-06-22 ENCOUNTER — CLINICAL SUPPORT (OUTPATIENT)
Dept: REHABILITATION | Facility: OTHER | Age: 30
End: 2020-06-22
Attending: PODIATRIST
Payer: COMMERCIAL

## 2020-06-22 DIAGNOSIS — S93.401D SPRAIN OF UNSPECIFIED LIGAMENT OF RIGHT ANKLE, SUBSEQUENT ENCOUNTER: ICD-10-CM

## 2020-06-22 DIAGNOSIS — M25.571 ACUTE RIGHT ANKLE PAIN: ICD-10-CM

## 2020-06-22 DIAGNOSIS — M24.571 EQUINUS CONTRACTURE OF RIGHT ANKLE: ICD-10-CM

## 2020-06-22 DIAGNOSIS — S93.401A SPRAIN OF RIGHT ANKLE, UNSPECIFIED LIGAMENT, INITIAL ENCOUNTER: ICD-10-CM

## 2020-06-22 DIAGNOSIS — M79.671 FOOT PAIN, RIGHT: ICD-10-CM

## 2020-06-22 PROCEDURE — 97110 THERAPEUTIC EXERCISES: CPT | Mod: PN,CQ

## 2020-06-22 PROCEDURE — 97140 MANUAL THERAPY 1/> REGIONS: CPT | Mod: PN,CQ

## 2020-06-22 NOTE — PROGRESS NOTES
"                            Physical Therapy Daily Treatment Note     Name: Zara Iniguez  Clinic Number: [id number]    Therapy Diagnosis:   Encounter Diagnoses   Name Primary?    Sprain of right ankle, unspecified ligament, initial encounter     Equinus contracture of right ankle     Acute right ankle pain     Foot pain, right     Sprain of unspecified ligament of right ankle, subsequent encounter      Physician: Jae Galindo DPM    Visit Date: 6/22/2020  Physician Orders: PT Eval and Treat , Modalities prn as indicated   Medical Diagnosis:    S93.401A (ICD-10-CM) - Sprain of right ankle, unspecified ligament, initial encounter   M24.573 (ICD-10-CM) - Equinus contracture of ankle   M79.671 (ICD-10-CM) - Foot pain, right   M25.571 (ICD-10-CM) - Acute right ankle pain   Date of Surgery: NA  Evaluation Date: 4/20/2020  Authorization Period Expiration: 12/31/2020  Plan of Care Certification Period:    6/19/2020  Visit # / Visits authorized: 9 (8/25)  Visit Date: 6/22/2020    Time In: 1452  Time Out: 1537   Total Billable Time: 43 minutes    Precautions: Standard    Subjective     Pt reports w/ 0/10 pn in R ankle today.     He was not fully compliant with home exercise program.  Response to previous treatment: no adverse effects  Functional change: no change     Pain: 1- 2/10  Location: ankle right    Objective     Zara received therapeutic exercises to develop strength, endurance, ROM and flexibility for 31 minutes including:    Half-kneel self DF mob at wall 30x3" - extensive education provided  Upright bike x 5' t0 increase blood flow   Calf stretch 90 sec x 1 standing knees ext, 1 x 90" knee flex  HSS on step 1 x 90 sec   SL deadlift (stationary drinking bird): x 15 ea LE with 15# KB ( reps lowered 2* muscle fatigue)     SLS plyotoss 2  x30 with yellow ball on oval pad B   SHuttle SL press B 20 x ea 50# w/ GTB for glute med activation    Ankle mobe standing w/ sports strap and step 3 x 30 sec   Lateral step " "GTB 40 ft x 4   step downs: 20 x 6" step - ea LE w/ manual resistance for glute med activation.    Seated Gastroc stretch 3 x 30" single leg w/ strap  SL heel raise 25x  SLS 3 x 30" airex B   SLS on foam oval  3 x 30" B ITIS  trampoline jogging with 3 step posting x 3'  Inv/ ev 20x - NP  AP 20x - NP  Resisted Eversion/Inversion: 30 x BTB B   Towel squeezes nv  B Heel raises 20x ea (toe out, toe in)  Seated Soleus stretch 3 x 30" -np  Lateral step 40 ft x 2 GTB VCs to correct hip ER  RB ML static 3x 30" np      Amir received the following manual therapy techniques: Joint mobilizations were applied to the: R ankle for 12 minutes, including:  Talocrural gapping HVLAT   Calf srtetch      Amir received cold pack for 0 minutes to R ankle.    Home Exercises Provided and Patient Education Provided     Education provided:   Pt edu on proper exercise technique.      Written Home Exercises Provided: Patient instructed to cont prior HEP.  Exercises were reviewed and Amir was able to demonstrate them prior to the end of the session.  Amir demonstrated good  understanding of the education provided.     See EMR under Patient Instructions for exercises provided prior visit.    Assessment   Pt had no adverse effects from tx.  Pt cont to have limited DF.  Pt showed increased strength and endurance during therex.     Amir is progressing well towards his goals.   Pt prognosis is Good.     Pt will continue to benefit from skilled outpatient physical therapy to address the deficits listed in the problem list box on initial evaluation, provide pt/family education and to maximize pt's level of independence in the home and community environment.     Pt's spiritual, cultural and educational needs considered and pt agreeable to plan of care and goals.    Anticipated barriers to physical therapy: none    Goals:   Short Term Goals: 2 weeks          1. Decreased R ankle pain to 3/10  Progressing, not met    Long Term Goals: 12 weeks "    1. Independent with HEP.  2. Report decreased    R   Ankle  pain  <   / =  1  /10 with adls such as basketball drills, walking  Progressing, not met  3. Increased MMT  for   R ankle to 5/5     Progressing, not met  4. Increased arom  for  R ankle to wfl  DF to + 5  Progressing, not met  5. Improved FOTO score to 23 to help with r/t basketball Progressing, not met         Plan     Cont to progress towards goals set by Pt focusing on ankle strength and balance.      Efren Land, PTA

## 2020-06-29 ENCOUNTER — CLINICAL SUPPORT (OUTPATIENT)
Dept: REHABILITATION | Facility: OTHER | Age: 30
End: 2020-06-29
Attending: PODIATRIST
Payer: COMMERCIAL

## 2020-06-29 DIAGNOSIS — S93.401A SPRAIN OF RIGHT ANKLE, UNSPECIFIED LIGAMENT, INITIAL ENCOUNTER: Primary | ICD-10-CM

## 2020-06-29 DIAGNOSIS — S93.401D SPRAIN OF UNSPECIFIED LIGAMENT OF RIGHT ANKLE, SUBSEQUENT ENCOUNTER: ICD-10-CM

## 2020-06-29 DIAGNOSIS — M25.571 ACUTE RIGHT ANKLE PAIN: ICD-10-CM

## 2020-06-29 DIAGNOSIS — M24.571 EQUINUS CONTRACTURE OF RIGHT ANKLE: ICD-10-CM

## 2020-06-29 DIAGNOSIS — M79.671 FOOT PAIN, RIGHT: ICD-10-CM

## 2020-06-29 PROCEDURE — 97110 THERAPEUTIC EXERCISES: CPT | Mod: PN | Performed by: PHYSICAL THERAPIST

## 2020-06-29 NOTE — PLAN OF CARE
Outpatient Therapy Updated Plan of Care     Visit Date: 6/29/2020  Name: Zara Iniguez  Clinic Number: [id number]    Therapy Diagnosis:   Encounter Diagnoses   Name Primary?    Sprain of right ankle, unspecified ligament, initial encounter Yes    Equinus contracture of right ankle     Acute right ankle pain     Foot pain, right     Sprain of unspecified ligament of right ankle, subsequent encounter      Physician: Jae Galindo, SHERITA    Physician Orders: PT Eval and Treat , Modalities prn as indicated   Medical Diagnosis:    S93.401A (ICD-10-CM) - Sprain of right ankle, unspecified ligament, initial encounter   M24.573 (ICD-10-CM) - Equinus contracture of ankle   M79.671 (ICD-10-CM) - Foot pain, right   M25.571 (ICD-10-CM) - Acute right ankle pain   Date of Surgery: NA  Evaluation Date: 4/20/2020    Total Visits Received: 10  Cancelled Visits: 3  No Show Visits: 0    Current Certification Period:  04/20/2020 to 6/19/2020  Precautions:  Standard   Visits from Evaluation Date:  9  Functional Level Prior to Evaluation:  I with ADLs, active with bike riding, jogging, and basketball. Average 6-10k steps/day    Subjective     Update: overall feeling better. Decreased pain currently and with activity. Pt has not attempted return to sport, reports that he is afraid that he will reinjure ankle if he attempts to return too quickly.     Objective     Update:     Continues with limited DF. Early heel lift noted in ambulation B, R>L        CMS Impairment/Limitation/Restriction for FOTO Ankle Survey    Therapist reviewed FOTO scores for Zara Iniguez on 6/29/2020.   FOTO documents entered into [employer] - see Media section.    CurrentScore: 29%    Evaluation: 40%  Goal: 23%               Assessment     Update: Zara is tolerating treatment very well. He continues with significant restrictions in DF B, R>L. He has not returned to sport, and expresses anxiety about progressing too quickly and causing reinjury. He would benefit  from continued intervention at this time to progress dynamic activities for return to prior level of function in home, community, and athletic environments.     Previous Short Term Goals Status:   met  New Short Term Goals Status:   Continue per LTG  1. Long Term Goal Status:   Independent with HEP. Met 6/29/2020   2. Report decreased    R   Ankle  pain  <   / =  1  /10 with adls such as basketball drills, walking  Progressing, not met  3. Increased MMT  for   R ankle to 5/5     Progressing, not met  4. Increased arom  for  R ankle to wfl  DF to + 5  Progressing, not met  Improved FOTO score to 23 to help with r/t basketball Progressing, not met  Reasons for Recertification of Therapy:   Progression towards increased agility, plyometrics, and dynamic activities for return to full PLOF in home, community, and athletics    Plan     Updated Certification Period: 6/29/2020 to 9/25/2020  Recommended Treatment Plan: 1 times per week for 12 weeks: Gait Training, Manual Therapy, Moist Heat/ Ice, Neuromuscular Re-ed, Patient Education and Therapeutic Exercise  Other Recommendations: n/a    Petrona Porras, PT  6/29/2020      I CERTIFY THE NEED FOR THESE SERVICES FURNISHED UNDER THIS PLAN OF TREATMENT AND WHILE UNDER MY CARE    Physician's comments:        Physician's Signature: ___________________________________________________

## 2020-06-29 NOTE — PROGRESS NOTES
"                            Physical Therapy Daily Treatment Note     Name: Zara ABDALLA Hira  Clinic Number: [id number]    Therapy Diagnosis:   Encounter Diagnoses   Name Primary?    Sprain of right ankle, unspecified ligament, initial encounter Yes    Equinus contracture of right ankle     Acute right ankle pain     Foot pain, right     Sprain of unspecified ligament of right ankle, subsequent encounter      Physician: Jae Galindo DPM    Visit Date: 6/29/2020  Physician Orders: PT Eval and Treat , Modalities prn as indicated   Medical Diagnosis:    S93.401A (ICD-10-CM) - Sprain of right ankle, unspecified ligament, initial encounter   M24.573 (ICD-10-CM) - Equinus contracture of ankle   M79.671 (ICD-10-CM) - Foot pain, right   M25.571 (ICD-10-CM) - Acute right ankle pain   Date of Surgery: NA  Evaluation Date: 4/20/2020  Authorization Period Expiration: 12/31/2020  Plan of Care Certification Period:   9/25/2020   Visit # / Visits authorized: 10 (9/25)  Visit Date: 6/29/2020    Time In: 1405  Time Out: 1445   Total Billable Time: 40 minutes    Precautions: Standard    Subjective     Pt reports no new complaints today. He reports riding his bicycle in community, and stationary bike in the gym at his office. He says he has not tried running or any sports because he feels like he is clumsy and could hurt himself     He was not fully compliant with home exercise program.  Response to previous treatment: no adverse effects  Functional change: no change     Pain: 0.5/10  Location: ankle right    Objective     Zara received therapeutic exercises to develop strength, endurance, ROM and flexibility for 40 minutes including:    Half-kneel self DF mob with super band 15 x 5"  Upright bike x 5' to increase blood flow   Calf stretch 90 sec x 1 standing knees ext, 1 x 90" knee flex  HSS on step 1 x 90 sec   SL deadlift (stationary drinking bird): x 15 ea LE with 15# KB ( reps lowered 2* muscle fatigue)     SLS plyotoss 2  " "x30 with blue ball on oval pad B   SHuttle SL press B 20 x ea 62.5# w/ GTB for glute med activation    Ankle mobe standing w/ sports strap and step 3 x 30 sec   Lateral step GTB 40 ft x 4   step downs: 20 x 6" step - ea LE w/ manual resistance for glute med activation.    Seated Gastroc stretch 3 x 30" single leg w/ strap  SL heel raise 25x  SLS 3 x 30" airex B   SLS on foam oval  3 x 30" B ITIS  trampoline jogging with 3 step posting x 3'  Inv/ ev 20x - NP  AP 20x - NP  Resisted Eversion/Inversion: 30 x BTB B   Towel squeezes nv  B Heel raises 20x ea (toe out, toe in)  Seated Soleus stretch 3 x 30" -np  Lateral step 40 ft x 2 GTB VCs to correct hip ER  RB ML static 3x 30" np      Amir received the following manual therapy techniques: Joint mobilizations were applied to the: R ankle for 00 minutes, including:  Talocrural gapping HVLAT   Calf stretch      Amir received cold pack for 0 minutes to R ankle.    Home Exercises Provided and Patient Education Provided     Education provided:   Pt edu on proper exercise technique.      Written Home Exercises Provided: Patient instructed to cont prior HEP.  Exercises were reviewed and Zara was able to demonstrate them prior to the end of the session.  Valeriar demonstrated good  understanding of the education provided.     See EMR under Patient Instructions for exercises provided prior visit.    Assessment   Zara is tolerating treatment very well. He continues with significant restrictions in DF B, R>L. He has not returned to sport, and expresses anxiety about progressing too quickly and causing reinjury. He would benefit from continued intervention at this time to progress dynamic activities for return to prior level of function in home, community, and athletic environments.     Zara is progressing well towards his goals.   Pt prognosis is Good.     Pt will continue to benefit from skilled outpatient physical therapy to address the deficits listed in the problem list box on " initial evaluation, provide pt/family education and to maximize pt's level of independence in the home and community environment.     Pt's spiritual, cultural and educational needs considered and pt agreeable to plan of care and goals.    Anticipated barriers to physical therapy: none    Goals:   Short Term Goals: 2 weeks          1. Decreased R ankle pain to 3/10  Met 6/29/2020    Long Term Goals: 12 weeks    1. Independent with HEP. Met 6/29/2020   2. Report decreased    R   Ankle  pain  <   / =  1  /10 with adls such as basketball drills, walking  Progressing, not met  3. Increased MMT  for   R ankle to 5/5     Progressing, not met  4. Increased arom  for  R ankle to wfl  DF to + 5  Progressing, not met  5. Improved FOTO score to 23 to help with r/t basketball Progressing, not met         Plan     Cont to progress towards goals set by Pt focusing on ankle strength and balance.      Petrona Porras, PT

## 2020-07-06 ENCOUNTER — CLINICAL SUPPORT (OUTPATIENT)
Dept: REHABILITATION | Facility: OTHER | Age: 30
End: 2020-07-06
Attending: PODIATRIST
Payer: COMMERCIAL

## 2020-07-06 DIAGNOSIS — M79.671 FOOT PAIN, RIGHT: ICD-10-CM

## 2020-07-06 DIAGNOSIS — S93.401D SPRAIN OF UNSPECIFIED LIGAMENT OF RIGHT ANKLE, SUBSEQUENT ENCOUNTER: ICD-10-CM

## 2020-07-06 DIAGNOSIS — M25.571 ACUTE RIGHT ANKLE PAIN: ICD-10-CM

## 2020-07-06 DIAGNOSIS — M24.571 EQUINUS CONTRACTURE OF RIGHT ANKLE: ICD-10-CM

## 2020-07-06 DIAGNOSIS — S93.401A SPRAIN OF RIGHT ANKLE, UNSPECIFIED LIGAMENT, INITIAL ENCOUNTER: ICD-10-CM

## 2020-07-06 PROCEDURE — 97110 THERAPEUTIC EXERCISES: CPT | Mod: PN,CQ

## 2020-07-06 NOTE — PROGRESS NOTES
"                            Physical Therapy Daily Treatment Note     Name: Zara Iniguez  Clinic Number: [id number]    Therapy Diagnosis:   No diagnosis found.  Physician: Jae Galindo DPM    Visit Date: 7/6/2020  Physician Orders: PT Eval and Treat , Modalities prn as indicated   Medical Diagnosis:    S93.401A (ICD-10-CM) - Sprain of right ankle, unspecified ligament, initial encounter   M24.573 (ICD-10-CM) - Equinus contracture of ankle   M79.671 (ICD-10-CM) - Foot pain, right   M25.571 (ICD-10-CM) - Acute right ankle pain   Date of Surgery: NA  Evaluation Date: 4/20/2020  Authorization Period Expiration: 12/31/2020  Plan of Care Certification Period:   9/25/2020   Visit # / Visits authorized: 10 (9/25)  Visit Date: 7/6/2020    Time In: 1405  Time Out: 1445   Total Billable Time: 40 minutes    Precautions: Standard    Subjective     Pt reports no new complaints today. He reports riding his bicycle in community, and stationary bike in the gym at his office. He says he has not tried running or any sports because he feels like he is clumsy and could hurt himself     He was not fully compliant with home exercise program.  Response to previous treatment: no adverse effects  Functional change: no change     Pain: 0.5/10  Location: ankle right    Objective     Zara received therapeutic exercises to develop strength, endurance, ROM and flexibility for 40 minutes including:    Half-kneel self DF mob with super band 15 x 5"  Upright bike x 5' to increase blood flow   Calf stretch 90 sec x 1 standing knees ext, 1 x 90" knee flex  HSS on step 1 x 90 sec   SL deadlift (stationary drinking bird): x 15 ea LE with 15# KB ( reps lowered 2* muscle fatigue)     SLS plyotoss 2  x30 with blue ball on oval pad B   SHuttle SL press B 20 x ea 62.5# w/ GTB for glute med activation    Ankle mobe standing w/ sports strap and step 3 x 30 sec   Lateral step GTB 40 ft x 4   step downs: 20 x 6" step - ea LE w/ manual resistance for glute " "med activation.    Seated Gastroc stretch 3 x 30" single leg w/ strap  SL heel raise 25x  SLS 3 x 30" airex B   SLS on foam oval  3 x 30" B ITIS  trampoline jogging with 3 step posting x 3'  Inv/ ev 20x - NP  AP 20x - NP  Resisted Eversion/Inversion: 30 x BTB B   Towel squeezes nv  B Heel raises 20x ea (toe out, toe in)  Seated Soleus stretch 3 x 30" -np  Lateral step 40 ft x 2 GTB VCs to correct hip ER  RB ML static 3x 30" np      Amir received the following manual therapy techniques: Joint mobilizations were applied to the: R ankle for 00 minutes, including:  Talocrural gapping HVLAT   Calf stretch      Amir received cold pack for 0 minutes to R ankle.    Home Exercises Provided and Patient Education Provided     Education provided:   Pt edu on proper exercise technique.      Written Home Exercises Provided: Patient instructed to cont prior HEP.  Exercises were reviewed and Amir was able to demonstrate them prior to the end of the session.  Amir demonstrated good  understanding of the education provided.     See EMR under Patient Instructions for exercises provided prior visit.    Assessment   Zara is tolerating treatment very well. He continues with significant restrictions in DF B, R>L. He has not returned to sport, and expresses anxiety about progressing too quickly and causing reinjury. He would benefit from continued intervention at this time to progress dynamic activities for return to prior level of function in home, community, and athletic environments.     Zara is progressing well towards his goals.   Pt prognosis is Good.     Pt will continue to benefit from skilled outpatient physical therapy to address the deficits listed in the problem list box on initial evaluation, provide pt/family education and to maximize pt's level of independence in the home and community environment.     Pt's spiritual, cultural and educational needs considered and pt agreeable to plan of care and goals.    Anticipated " barriers to physical therapy: none    Goals:   Short Term Goals: 2 weeks          1. Decreased R ankle pain to 3/10  Met 6/29/2020    Long Term Goals: 12 weeks    1. Independent with HEP. Met 6/29/2020   2. Report decreased    R   Ankle  pain  <   / =  1  /10 with adls such as basketball drills, walking  Progressing, not met  3. Increased MMT  for   R ankle to 5/5     Progressing, not met  4. Increased arom  for  R ankle to wfl  DF to + 5  Progressing, not met  5. Improved FOTO score to 23 to help with r/t basketball Progressing, not met         Plan     Cont to progress towards goals set by Pt focusing on ankle strength and balance.      Gabrielle Ellsworth, PT

## 2020-07-06 NOTE — PROGRESS NOTES
"                            Physical Therapy Daily Treatment Note     Name: Zara ABDALLA Hira  Clinic Number: [id number]    Therapy Diagnosis:   Encounter Diagnoses   Name Primary?    Sprain of right ankle, unspecified ligament, initial encounter     Equinus contracture of right ankle     Acute right ankle pain     Foot pain, right     Sprain of unspecified ligament of right ankle, subsequent encounter      Physician: Jae Galindo DPM    Visit Date: 7/6/2020  Physician Orders: PT Eval and Treat , Modalities prn as indicated   Medical Diagnosis:    S93.401A (ICD-10-CM) - Sprain of right ankle, unspecified ligament, initial encounter   M24.573 (ICD-10-CM) - Equinus contracture of ankle   M79.671 (ICD-10-CM) - Foot pain, right   M25.571 (ICD-10-CM) - Acute right ankle pain   Date of Surgery: NA  Evaluation Date: 4/20/2020  Authorization Period Expiration: 12/31/2020  Plan of Care Certification Period:   9/25/2020   Visit # / Visits authorized: 11 (9/25)  Visit Date: 7/6/2020    Time In: 1437 (pt arrived   Time Out: 1510  Total Billable Time: 30 minutes    Precautions: Standard    Subjective     Pt reports feeling well. States he has been bike riding for about 30 min sessions and has been doing well. States he is nervous about running yet.     He was not fully compliant with home exercise program.  Response to previous treatment: no adverse effects  Functional change: no change     Pain: 0/10  Location: ankle right    Objective     Zara received therapeutic exercises to develop strength, endurance, ROM and flexibility for 30 minutes including:    Half-kneel self DF mob with super band 15 x 5"  Upright bike x 5' to increase blood flow    Calf stretch 90 sec x 1 standing knees ext, 1 x 90" knee flex  HSS on step 1 x 90 sec   SL deadlift (stationary drinking bird): x 15 ea LE with 15# KB ( reps lowered 2* muscle fatigue)     SLS plyotoss 2  x30 with blue ball on oval pad B   Shuttle SL press B 20 x ea 62.5# w/ GTB for " "glute med activation     Ankle mobe standing w/ sports strap and step 3 x 30 sec   Lateral step GTB 40 ft x 4   step downs: 20 x 6" step - ea LE w/ manual resistance for glute med activation.    Seated Gastroc stretch 3 x 30" single leg w/ strap  SL heel raise 25x  SLS 3 x 30" airex B   SLS on foam oval  3 x 30" B ITIS  trampoline jogging with 3 step posting x 3'  Inv/ ev 20x - NP  AP 20x - NP  Resisted Eversion/Inversion: 30 x BTB B   Towel squeezes nv  B Heel raises 20x ea (toe out, toe in)  Seated Soleus stretch 3 x 30" -np  Lateral step 40 ft x 2 GTB VCs to correct hip ER  RB ML static 3x 30" np      Amir received the following manual therapy techniques: Joint mobilizations were applied to the: R ankle for 00 minutes, including:  Talocrural gapping HVLAT   Calf stretch      Amir received cold pack for 0 minutes to R ankle.    Home Exercises Provided and Patient Education Provided     Education provided:   Pt edu on proper exercise technique.      Written Home Exercises Provided: Patient instructed to cont prior HEP.  Exercises were reviewed and Amir was able to demonstrate them prior to the end of the session.  Amir demonstrated good  understanding of the education provided.     See EMR under Patient Instructions for exercises provided prior visit.    Assessment   Pt tolerated exercise well. Pt was able to complete documented exercises with minimal to no complaints of pain. Muscle weakness in peroneals and plantarflexors continue to remain notable at this time. ROM deficits continue to be present with DF. PT/PTA face to face conference with evaluating therapist, concerning pt status/TX.       Valeriar is progressing well towards his goals.   Pt prognosis is Good.     Pt will continue to benefit from skilled outpatient physical therapy to address the deficits listed in the problem list box on initial evaluation, provide pt/family education and to maximize pt's level of independence in the home and community " environment.     Pt's spiritual, cultural and educational needs considered and pt agreeable to plan of care and goals.    Anticipated barriers to physical therapy: none    Goals:   Short Term Goals: 2 weeks          1. Decreased R ankle pain to 3/10  Met 6/29/2020    Long Term Goals: 12 weeks    1. Independent with HEP. Met 6/29/2020   2. Report decreased    R   Ankle  pain  <   / =  1  /10 with adls such as basketball drills, walking  Progressing, not met  3. Increased MMT  for   R ankle to 5/5     Progressing, not met  4. Increased arom  for  R ankle to wfl  DF to + 5  Progressing, not met  5. Improved FOTO score to 23 to help with r/t basketball Progressing, not met         Plan     Cont to progress towards goals set by Pt focusing on ankle strength and balance.      Mathew Arguello, PTA

## 2020-07-14 ENCOUNTER — CLINICAL SUPPORT (OUTPATIENT)
Dept: REHABILITATION | Facility: OTHER | Age: 30
End: 2020-07-14
Attending: PODIATRIST
Payer: COMMERCIAL

## 2020-07-14 DIAGNOSIS — S93.401D SPRAIN OF UNSPECIFIED LIGAMENT OF RIGHT ANKLE, SUBSEQUENT ENCOUNTER: ICD-10-CM

## 2020-07-14 DIAGNOSIS — S93.401A SPRAIN OF RIGHT ANKLE, UNSPECIFIED LIGAMENT, INITIAL ENCOUNTER: Primary | ICD-10-CM

## 2020-07-14 DIAGNOSIS — M24.571 EQUINUS CONTRACTURE OF RIGHT ANKLE: ICD-10-CM

## 2020-07-14 DIAGNOSIS — M79.671 FOOT PAIN, RIGHT: ICD-10-CM

## 2020-07-14 DIAGNOSIS — M25.571 ACUTE RIGHT ANKLE PAIN: ICD-10-CM

## 2020-07-14 PROCEDURE — 97110 THERAPEUTIC EXERCISES: CPT | Mod: PN

## 2020-07-14 NOTE — PROGRESS NOTES
"                            Physical Therapy Daily Treatment Note     Name: Zara Iniguez  Clinic Number: [id number]    Therapy Diagnosis:   Encounter Diagnoses   Name Primary?    Sprain of right ankle, unspecified ligament, initial encounter Yes    Equinus contracture of right ankle     Acute right ankle pain     Foot pain, right     Sprain of unspecified ligament of right ankle, subsequent encounter      Physician: Jae Galindo DPM    Visit Date: 7/14/2020  Physician Orders: PT Eval and Treat , Modalities prn as indicated   Medical Diagnosis:    S93.401A (ICD-10-CM) - Sprain of right ankle, unspecified ligament, initial encounter   M24.573 (ICD-10-CM) - Equinus contracture of ankle   M79.671 (ICD-10-CM) - Foot pain, right   M25.571 (ICD-10-CM) - Acute right ankle pain   Date of Surgery: NA  Evaluation Date: 4/20/2020  Authorization Period Expiration: 12/31/2020  Plan of Care Certification Period:   9/25/2020   Visit # / Visits authorized: 12 (10/25)  Visit Date: 7/14/2020    Time In: 2:30  Time Out: 3:20  Total Billable Time: 50 minutes    Precautions: Standard    Subjective     Pt reports: able to jog without ankle pain, but has not been sprinting because of the hot weather. Able to stay active with riding his bike without difficulty or pain. Ankles feel stiff with squatting "but they felt like that before this happened."     States he is nervous about running yet.     He was not fully compliant with home exercise program.  Response to previous treatment: no adverse effects  Functional change: no change     Pain: 0/10  Location: ankle right    Objective        CMS Impairment/Limitation/Restriction for FOTO Ankle Survey     Therapist reviewed FOTO scores for Zara Iniguez on 7/14/2020.   FOTO documents entered into [employer] - see Media section.     CurrentScore: 1%     Evaluation: 40%  Goal: 23%                   Valeriar received therapeutic exercises to develop strength, endurance, ROM and flexibility for 50 " "minutes including:    Half-kneel self DF mob with super band 15 x 5"  Upright bike x 5' to increase blood flow    Calf stretch 90 sec x 1 standing knees ext, 1 x 90" knee flex  HSS on step 1 x 90 sec   SL deadlift (stationary drinking bird): x 15 ea LE with 15# KB ( reps lowered 2* muscle fatigue)     SLS plyotoss 2  x30 with blue ball on oval pad B   Shuttle SL press B 20 x ea 62.5# w/ GTB for glute med activation   +shuttle DL plyo 30 x 62.5#   +doorway jumps: 30x  +lateral agility: 1' ea LE with orange sports cord      Lateral step GTB 40 ft x 4   step downs: 20 x 6" step - ea LE w/ manual resistance for glute med activation.    Seated Gastroc stretch 3 x 30" single leg w/ strap  SL heel raise 25x  SLS 3 x 30" airex B   SLS on foam oval  3 x 30" B ITIS  trampoline jogging with 3 step posting x 3'  Inv/ ev 20x - NP  AP 20x - NP  Resisted Eversion/Inversion: 30 x BTB B   Towel squeezes nv  B Heel raises 20x ea (toe out, toe in)  Seated Soleus stretch 3 x 30" -np  Lateral step 40 ft x 2 GTB VCs to correct hip ER  RB ML static 3x 30" np      Amir received the following manual therapy techniques: Joint mobilizations were applied to the: R ankle for 00 minutes, including:  Talocrural gapping HVLAT   Calf stretch      Amir received cold pack for 0 minutes to R ankle.    Home Exercises Provided and Patient Education Provided     Education provided:   Pt edu on proper exercise technique.      Written Home Exercises Provided: Patient instructed to cont prior HEP.  Exercises were reviewed and Amir was able to demonstrate them prior to the end of the session.  Amir demonstrated good  understanding of the education provided.     See EMR under Patient Instructions for exercises provided prior visit.    Assessment   Pt tolerated exercise well. Able to initiate weight bearing plyometrics today without increased c/o ankle pain. demo'd tendency to land on forefoot. Progress agility work next visit with emphases in ankle stability. " Also educate on running progression next visit.      Zara is progressing well towards his goals.   Pt prognosis is Good.     Pt will continue to benefit from skilled outpatient physical therapy to address the deficits listed in the problem list box on initial evaluation, provide pt/family education and to maximize pt's level of independence in the home and community environment.     Pt's spiritual, cultural and educational needs considered and pt agreeable to plan of care and goals.    Anticipated barriers to physical therapy: none    Goals:   Short Term Goals: 2 weeks          1. Decreased R ankle pain to 3/10  Met 6/29/2020    Long Term Goals: 12 weeks    1. Independent with HEP. Met 6/29/2020   2. Report decreased    R   Ankle  pain  <   / =  1  /10 with adls such as basketball drills, walking  Progressing, not met  3. Increased MMT  for   R ankle to 5/5     Progressing, not met  4. Increased arom  for  R ankle to wfl  DF to + 5  Progressing, not met  5. Improved FOTO score to 23 to help with r/t basketball Progressing, not met         Plan     Cont to progress towards goals set by Pt focusing on ankle strength and balance.      Gabrielle Ellsworth, PT

## 2020-07-20 ENCOUNTER — CLINICAL SUPPORT (OUTPATIENT)
Dept: REHABILITATION | Facility: OTHER | Age: 30
End: 2020-07-20
Attending: PODIATRIST
Payer: COMMERCIAL

## 2020-07-20 DIAGNOSIS — M24.571 EQUINUS CONTRACTURE OF RIGHT ANKLE: ICD-10-CM

## 2020-07-20 DIAGNOSIS — M79.671 FOOT PAIN, RIGHT: ICD-10-CM

## 2020-07-20 DIAGNOSIS — S93.401D SPRAIN OF UNSPECIFIED LIGAMENT OF RIGHT ANKLE, SUBSEQUENT ENCOUNTER: ICD-10-CM

## 2020-07-20 DIAGNOSIS — S93.401A SPRAIN OF RIGHT ANKLE, UNSPECIFIED LIGAMENT, INITIAL ENCOUNTER: Primary | ICD-10-CM

## 2020-07-20 DIAGNOSIS — M25.571 ACUTE RIGHT ANKLE PAIN: ICD-10-CM

## 2020-07-20 PROCEDURE — 97110 THERAPEUTIC EXERCISES: CPT | Mod: PN

## 2020-07-20 NOTE — PROGRESS NOTES
"                            Physical Therapy Daily Treatment Note     Name: Zara Iniguez  Clinic Number: [id number]    Therapy Diagnosis:   Encounter Diagnoses   Name Primary?    Sprain of right ankle, unspecified ligament, initial encounter Yes    Equinus contracture of right ankle     Acute right ankle pain     Foot pain, right     Sprain of unspecified ligament of right ankle, subsequent encounter      Physician: Jae Galindo DPM    Visit Date: 7/20/2020  Physician Orders: PT Eval and Treat , Modalities prn as indicated   Medical Diagnosis:    S93.401A (ICD-10-CM) - Sprain of right ankle, unspecified ligament, initial encounter   M24.573 (ICD-10-CM) - Equinus contracture of ankle   M79.671 (ICD-10-CM) - Foot pain, right   M25.571 (ICD-10-CM) - Acute right ankle pain     Evaluation Date: 4/20/2020  Authorization Period Expiration: 12/31/2020  Plan of Care Certification Period:   9/25/2020   Visit # / Visits authorized: 13 (11/25)  Visit Date: 7/20/2020    Time In: 2:30  Time Out: 3:20  Total Billable Time: 50 minutes    Precautions: Standard    Subjective     Pt reports: no soreness after previous visit. Does not do much activity or sports outside due to extreme heat.     States he is nervous about running yet.     He was not fully compliant with home exercise program.  Response to previous treatment: no adverse effects  Functional change: no change     Pain: 0/10  Location: ankle - right    Objective        CMS Impairment/Limitation/Restriction for FOTO Ankle Survey     Therapist reviewed FOTO scores for Zara Iniguez on 7/14/2020.   FOTO documents entered into [employer] - see Media section.     CurrentScore: 1%     Evaluation: 40%  Goal: 23%                   Valeriar received therapeutic exercises to develop strength, endurance, ROM and flexibility for 50 minutes including:    Half-kneel self DF mob with super band 15 x 5"  Upright bike x 5' to increase blood flow    Calf stretch 90 sec x 1 standing knees " "ext, 1 x 90" knee flex  HSS on step 1 x 90 sec   SL deadlift (stationary drinking bird): x 15 ea LE with 15# KB ( reps lowered 2* muscle fatigue)     SLS plyotoss 2  x30 with blue ball, w/ airex  +SL plyo toss Lateral 10x NO blue airex today, consider green foam next visit  Shuttle SL press B 20 x ea 75# w/ GTB for glute med activation   shuttle DL plyo 30 x 75#   doorway jumps: 30x  lateral agility: 1' ea LE with orange sports cord  +side shuffle with cone tone taps 20x  +T drill with cones 10x  +box jumps 20x 8" step        Lateral step GTB 40 ft x 4   step downs: 20 x 6" step - ea LE w/ manual resistance for glute med activation.    Seated Gastroc stretch 3 x 30" single leg w/ strap  SL heel raise 25x  SLS 3 x 30" airex B   SLS on foam oval  3 x 30" B ITIS  trampoline jogging with 3 step posting x 3'  Inv/ ev 20x - NP  AP 20x - NP  Resisted Eversion/Inversion: 30 x BTB B   Towel squeezes nv  B Heel raises 20x ea (toe out, toe in)  Seated Soleus stretch 3 x 30" -np  Lateral step 40 ft x 2 GTB VCs to correct hip ER  RB ML static 3x 30" np      Amir received the following manual therapy techniques: Joint mobilizations were applied to the: R ankle for 00 minutes, including:  Talocrural gapping HVLAT   Calf stretch      Amir received cold pack for 0 minutes to R ankle.    Home Exercises Provided and Patient Education Provided     Education provided:   Pt edu on proper exercise technique.      Written Home Exercises Provided: Patient instructed to cont prior HEP.  Exercises were reviewed and Amir was able to demonstrate them prior to the end of the session.  Amir demonstrated good  understanding of the education provided.     See EMR under Patient Instructions for exercises provided prior visit.    Assessment     Able to initiate jumping, running, and agility work today. Good tolerance with VC for appropriate landing without increasing risk for ankle sprain. LE fatigue noted by end of session with decreased stability " in SLS.      Zara is progressing well towards his goals.   Pt prognosis is Good.     Pt will continue to benefit from skilled outpatient physical therapy to address the deficits listed in the problem list box on initial evaluation, provide pt/family education and to maximize pt's level of independence in the home and community environment.     Pt's spiritual, cultural and educational needs considered and pt agreeable to plan of care and goals.    Anticipated barriers to physical therapy: none    Goals:   Short Term Goals: 2 weeks          1. Decreased R ankle pain to 3/10  Met 6/29/2020    Long Term Goals: 12 weeks    1. Independent with HEP. Met 6/29/2020   2. Report decreased    R   Ankle  pain  <   / =  1  /10 with adls such as basketball drills, walking  Progressing, not met  3. Increased MMT  for   R ankle to 5/5     Progressing, not met  4. Increased arom  for  R ankle to wfl  DF to + 5  Progressing, not met  5. Improved FOTO score to 23 to help with r/t basketball Progressing, not met         Plan     Cont to progress towards goals set by Pt focusing on ankle strength and balance.      Gabrielle Ellsworth, PT

## 2020-07-27 ENCOUNTER — CLINICAL SUPPORT (OUTPATIENT)
Dept: REHABILITATION | Facility: OTHER | Age: 30
End: 2020-07-27
Attending: PODIATRIST
Payer: COMMERCIAL

## 2020-07-27 DIAGNOSIS — M25.571 ACUTE RIGHT ANKLE PAIN: ICD-10-CM

## 2020-07-27 DIAGNOSIS — M24.571 EQUINUS CONTRACTURE OF RIGHT ANKLE: ICD-10-CM

## 2020-07-27 DIAGNOSIS — M79.671 FOOT PAIN, RIGHT: ICD-10-CM

## 2020-07-27 DIAGNOSIS — S93.401A SPRAIN OF RIGHT ANKLE, UNSPECIFIED LIGAMENT, INITIAL ENCOUNTER: Primary | ICD-10-CM

## 2020-07-27 DIAGNOSIS — S93.401D SPRAIN OF UNSPECIFIED LIGAMENT OF RIGHT ANKLE, SUBSEQUENT ENCOUNTER: ICD-10-CM

## 2020-07-27 PROCEDURE — 97110 THERAPEUTIC EXERCISES: CPT | Mod: PN

## 2020-07-27 NOTE — PROGRESS NOTES
"                            Physical Therapy Daily Treatment Note     Name: Zara Iniguez  Clinic Number: [id number]    Therapy Diagnosis:   Encounter Diagnoses   Name Primary?    Sprain of right ankle, unspecified ligament, initial encounter Yes    Equinus contracture of right ankle     Acute right ankle pain     Foot pain, right     Sprain of unspecified ligament of right ankle, subsequent encounter      Physician: Jae Galindo DPM    Visit Date: 7/27/2020  Physician Orders: PT Eval and Treat , Modalities prn as indicated   Medical Diagnosis:    S93.401A (ICD-10-CM) - Sprain of right ankle, unspecified ligament, initial encounter   M24.573 (ICD-10-CM) - Equinus contracture of ankle   M79.671 (ICD-10-CM) - Foot pain, right   M25.571 (ICD-10-CM) - Acute right ankle pain     Evaluation Date: 4/20/2020  Authorization Period Expiration: 12/31/2020  Plan of Care Certification Period:   9/25/2020   Visit # / Visits authorized: 14 (12/25)  Visit Date: 7/27/2020    Time In: 2:30  Time Out: 3:23  Total Billable Time: 53 minutes    Precautions: Standard    Subjective     Pt reports: fatigue after previous visit but denies soreness.      States he is nervous about running yet.     He was not fully compliant with home exercise program.  Response to previous treatment: no adverse effects  Functional change: no change     Pain: 0/10  Location: ankle - right    Objective        CMS Impairment/Limitation/Restriction for FOTO Ankle Survey     Therapist reviewed FOTO scores for Zara Iniguez on 7/14/2020.   FOTO documents entered into [employer] - see Media section.     CurrentScore: 1%     Evaluation: 40%  Goal: 23%                   Zara received therapeutic exercises to develop strength, endurance, ROM and flexibility for 53 minutes including:    Half-kneel self DF mob with super band 15 x 5"  Upright bike x 5' to increase blood flow    Calf stretch 90 sec x 1 standing knees ext, 1 x 90" knee flex  HSS on step 1 x 90 sec   SL " "deadlift (stationary drinking bird): x 15 ea LE with 15# KB ( reps lowered 2* muscle fatigue)     SLS plyotoss 2  x30 with blue ball, w/ airex  SL plyo toss Lateral 10x NO blue airex today, consider green foam next visit  Shuttle SL press B 20 x ea 75# w/ GTB for glute med activation   shuttle DL plyo 30 x 75#   doorway jumps: 30x  lateral agility: 1' ea LE with orange sports cord  side shuffle with cone tone taps 20x  T drill with cones 10x  box jumps 20x 8" step  +SL squat 2 x 15      Lateral step GTB 40 ft x 4   step downs: 20 x 6" step - ea LE w/ manual resistance for glute med activation.    Seated Gastroc stretch 3 x 30" single leg w/ strap  SL heel raise 25x  SLS 3 x 30" airex B   SLS on foam oval  3 x 30" B ITIS  trampoline jogging with 3 step posting x 3'  Inv/ ev 20x - NP  AP 20x - NP  Resisted Eversion/Inversion: 30 x BTB B   Towel squeezes nv  B Heel raises 20x ea (toe out, toe in)  Seated Soleus stretch 3 x 30" -np  Lateral step 40 ft x 2 GTB VCs to correct hip ER  RB ML static 3x 30" np      Amir received the following manual therapy techniques: Joint mobilizations were applied to the: R ankle for 00 minutes, including:  Talocrural gapping HVLAT   Calf stretch      Amir received cold pack for 0 minutes to R ankle.    Home Exercises Provided and Patient Education Provided     Education provided:   Pt edu on proper exercise technique.      Written Home Exercises Provided: Patient instructed to cont prior HEP.  Exercises were reviewed and Amir was able to demonstrate them prior to the end of the session.  Amir demonstrated good  understanding of the education provided.     See EMR under Patient Instructions for exercises provided prior visit.    Assessment     Increased fatigue by end of session with increased knee valgus collapse and frequent LOB during SL squats. Heavy VC for toe-heel-squat landing during box jumps to limit strain on knee and achilles tendon. Pt presents with good strength and " functional ROM. Consider discharge at next visit as pt no longer has difficulty or pain with functional or recreational activities.    Amir is progressing well towards his goals.   Pt prognosis is Good.     Pt will continue to benefit from skilled outpatient physical therapy to address the deficits listed in the problem list box on initial evaluation, provide pt/family education and to maximize pt's level of independence in the home and community environment.     Pt's spiritual, cultural and educational needs considered and pt agreeable to plan of care and goals.    Anticipated barriers to physical therapy: none    Goals:   Short Term Goals: 2 weeks          1. Decreased R ankle pain to 3/10  Met 6/29/2020    Long Term Goals: 12 weeks    1. Independent with HEP. Met 6/29/2020   2. Report decreased    R   Ankle  pain  <   / =  1  /10 with adls such as basketball drills, walking  Progressing, not met  3. Increased MMT  for   R ankle to 5/5     Progressing, not met  4. Increased arom  for  R ankle to wfl  DF to + 5  Progressing, not met  5. Improved FOTO score to 23 to help with r/t basketball Progressing, not met         Plan     Cont to progress towards goals set by Pt focusing on ankle strength and balance.      Gabrielle Ellsworth, PT

## 2020-08-03 ENCOUNTER — CLINICAL SUPPORT (OUTPATIENT)
Dept: REHABILITATION | Facility: OTHER | Age: 30
End: 2020-08-03
Attending: PODIATRIST
Payer: COMMERCIAL

## 2020-08-03 DIAGNOSIS — M25.571 ACUTE RIGHT ANKLE PAIN: ICD-10-CM

## 2020-08-03 DIAGNOSIS — S93.401A SPRAIN OF RIGHT ANKLE, UNSPECIFIED LIGAMENT, INITIAL ENCOUNTER: Primary | ICD-10-CM

## 2020-08-03 DIAGNOSIS — S93.401D SPRAIN OF UNSPECIFIED LIGAMENT OF RIGHT ANKLE, SUBSEQUENT ENCOUNTER: ICD-10-CM

## 2020-08-03 DIAGNOSIS — M24.571 EQUINUS CONTRACTURE OF RIGHT ANKLE: ICD-10-CM

## 2020-08-03 DIAGNOSIS — M79.671 FOOT PAIN, RIGHT: ICD-10-CM

## 2020-08-03 PROCEDURE — 97110 THERAPEUTIC EXERCISES: CPT | Mod: PN

## 2020-08-03 NOTE — PROGRESS NOTES
"                            Physical Therapy Daily Treatment Note     Name: Zara ABDALLA Hira  Clinic Number: [id number]    Therapy Diagnosis:   Encounter Diagnoses   Name Primary?    Sprain of right ankle, unspecified ligament, initial encounter Yes    Equinus contracture of right ankle     Acute right ankle pain     Foot pain, right     Sprain of unspecified ligament of right ankle, subsequent encounter      Physician: Jae Galindo DPM    Visit Date: 8/3/2020  Physician Orders: PT Eval and Treat , Modalities prn as indicated   Medical Diagnosis:    S93.401A (ICD-10-CM) - Sprain of right ankle, unspecified ligament, initial encounter   M24.573 (ICD-10-CM) - Equinus contracture of ankle   M79.671 (ICD-10-CM) - Foot pain, right   M25.571 (ICD-10-CM) - Acute right ankle pain     Evaluation Date: 4/20/2020  Authorization Period Expiration: 12/31/2020  Plan of Care Certification Period:   9/25/2020   Visit # / Visits authorized: 15 / 25  Visit Date: 8/3/2020    Time In: 2:30  Time Out: 3:20  Total Billable Time: 50 minutes - D/C today    Precautions: Standard    Subjective     Pt reports: ankle feels good today. Attempted running over the weekend and denies pain, just tightness from not having run in a long time. Says that he would like today to be his last day of therapy since he is back to running.    He was not fully compliant with home exercise program.  Response to previous treatment: no adverse effects  Functional change: no change     Pain: 0/10  Location: ankle - right    Objective       Zara received therapeutic exercises to develop strength, endurance, ROM and flexibility for 50 minutes including:    Upright bike x 5' to increase blood flow    Half-kneel self DF mob with super band 15 x 5"  +dynamic warm up: heel walks, toe walks, high knees, butt kicks, walking lunge with rotation holding pink weight ball, lateral lunge <> squat, toy soldier <> drinking bird, inch worms - 1 lap ea (length of gym)  +SLS " "with weight toss: 2 x 30" on blue foam  +SLS with PNF diagonals with pink resistance ball: 1 x 10 ea direction    Calf stretch 90 sec x 1 standing knees ext, 1 x 90" knee flex  HSS on step 1 x 90 sec   SL deadlift (stationary drinking bird): x 15 ea LE with 15# KB ( reps lowered 2* muscle fatigue)     SLS plyotoss 2  x30 with blue ball, w/ airex  SL plyo toss Lateral 10x NO blue airex today, consider green foam next visit  Shuttle SL press B 20 x ea 75# w/ GTB for glute med activation   shuttle DL plyo 30 x 75#   doorway jumps: 30x  lateral agility: 1' ea LE with orange sports cord  side shuffle with cone tone taps 20x  T drill with cones 10x  box jumps 20x 8" step  SL squat 2 x 15      Lateral step GTB 40 ft x 4   step downs: 20 x 6" step - ea LE w/ manual resistance for glute med activation.    Seated Gastroc stretch 3 x 30" single leg w/ strap  SL heel raise 25x  SLS 3 x 30" airex B   SLS on foam oval  3 x 30" B ITIS  trampoline jogging with 3 step posting x 3'  Inv/ ev 20x - NP  AP 20x - NP  Resisted Eversion/Inversion: 30 x BTB B   Towel squeezes nv  B Heel raises 20x ea (toe out, toe in)  Seated Soleus stretch 3 x 30" -np  Lateral step 40 ft x 2 GTB VCs to correct hip ER  RB ML static 3x 30" np    Home Exercises Provided and Patient Education Provided     Education provided:   Pt edu on proper exercise technique.      Written Home Exercises Provided: Patient instructed to cont prior HEP.  Exercises were reviewed and Amir was able to demonstrate them prior to the end of the session.  Amir demonstrated good  understanding of the education provided.     See EMR under Patient Instructions for exercises provided prior visit.    Assessment     Pt presents with functional ROM and strength to allow for good dynamic stability with ADLs and recreational activities. Improved tolerance with agility drills, dynamic and rotary stability exercises. Pt is independent with HEP and demonstrates good return technique. Pt no " longer has pain or difficulty with functional activities and has returned to PLOF. Pt has progressed well and has met 6 of 6 therapeutic goals. Pt no longer requires skilled PT. Recommend D/C from skilled care.    Goals:   Short Term Goals: 2 weeks          1. Decreased R ankle pain to 3/10  Met 6/29/2020    Long Term Goals: 12 weeks    1. Independent with HEP. Met 6/29/2020   2. Report decreased    R   Ankle  pain  <   / =  1  /10 with adls such as basketball drills, walking  Met 8/3/2020  3. Increased MMT  for   R ankle to 5/5     Met 8/3/2020  4. Increased arom  for  R ankle to wfl  DF to + 5  Met 8/3/2020  5. Improved FOTO score to 23 to help with r/t basketball Met 8/3/2020         Plan     D/C with HEP. Advised pt to F/U with MD if symptoms worsen or do not resolve.    Gabrielle Ellsworth, PT

## 2020-08-04 PROBLEM — M25.571 ACUTE RIGHT ANKLE PAIN: Status: RESOLVED | Noted: 2020-04-27 | Resolved: 2020-08-04

## 2020-08-04 PROBLEM — M24.571 EQUINUS CONTRACTURE OF RIGHT ANKLE: Status: RESOLVED | Noted: 2020-04-27 | Resolved: 2020-08-04

## 2020-08-04 PROBLEM — S93.401A SPRAIN OF RIGHT ANKLE: Status: RESOLVED | Noted: 2020-04-27 | Resolved: 2020-08-04

## 2020-08-04 PROBLEM — S93.401D SPRAIN OF UNSPECIFIED LIGAMENT OF RIGHT ANKLE, SUBSEQUENT ENCOUNTER: Status: RESOLVED | Noted: 2020-04-27 | Resolved: 2020-08-04

## 2020-08-04 PROBLEM — M79.671 FOOT PAIN, RIGHT: Status: RESOLVED | Noted: 2020-04-27 | Resolved: 2020-08-04

## 2020-10-20 ENCOUNTER — OFFICE VISIT (OUTPATIENT)
Dept: PODIATRY | Facility: CLINIC | Age: 30
End: 2020-10-20
Payer: COMMERCIAL

## 2020-10-20 VITALS
SYSTOLIC BLOOD PRESSURE: 128 MMHG | HEART RATE: 65 BPM | HEIGHT: 72 IN | BODY MASS INDEX: 29.26 KG/M2 | WEIGHT: 216.06 LBS | DIASTOLIC BLOOD PRESSURE: 78 MMHG

## 2020-10-20 DIAGNOSIS — M24.573 EQUINUS CONTRACTURE OF ANKLE: ICD-10-CM

## 2020-10-20 DIAGNOSIS — M25.571 ACUTE RIGHT ANKLE PAIN: ICD-10-CM

## 2020-10-20 DIAGNOSIS — M79.671 FOOT PAIN, RIGHT: ICD-10-CM

## 2020-10-20 DIAGNOSIS — S93.401D SPRAIN OF RIGHT ANKLE, UNSPECIFIED LIGAMENT, SUBSEQUENT ENCOUNTER: Primary | ICD-10-CM

## 2020-10-20 PROCEDURE — 99999 PR PBB SHADOW E&M-EST. PATIENT-LVL III: ICD-10-PCS | Mod: PBBFAC,,, | Performed by: PODIATRIST

## 2020-10-20 PROCEDURE — 99999 PR PBB SHADOW E&M-EST. PATIENT-LVL III: CPT | Mod: PBBFAC,,, | Performed by: PODIATRIST

## 2020-10-20 RX ORDER — DEXAMETHASONE SODIUM PHOSPHATE 4 MG/ML
4 INJECTION, SOLUTION INTRA-ARTICULAR; INTRALESIONAL; INTRAMUSCULAR; INTRAVENOUS; SOFT TISSUE
Status: COMPLETED | OUTPATIENT
Start: 2020-10-20 | End: 2020-10-20

## 2020-10-20 RX ORDER — LIDOCAINE HYDROCHLORIDE 20 MG/ML
JELLY TOPICAL
Qty: 30 ML | Refills: 2 | Status: SHIPPED | OUTPATIENT
Start: 2020-10-20 | End: 2021-03-11

## 2020-10-20 RX ADMIN — DEXAMETHASONE SODIUM PHOSPHATE 4 MG: 4 INJECTION, SOLUTION INTRA-ARTICULAR; INTRALESIONAL; INTRAMUSCULAR; INTRAVENOUS; SOFT TISSUE at 09:10

## 2020-10-20 NOTE — PROGRESS NOTES
Subjective:      Patient ID: Zara Iniguez is a 30 y.o. male.    Chief Complaint: Ankle Pain (1/10 right ankle pain)    Patient presents to clinic with chief complaint of deep aching pain top/front right ankle from twisting ankle in basketball about 6 months ago - improved well with fx boot, ACE, RIICE, nsaid, Physical therapy. Completed PT August which he reports significant improvement. Rates pain as 1/10, worse in the AM with the first few steps. Aggravated by increased weight bearing, shoe gear, pressure. Denies repeat trauma, surgery right foot/ankle.  Foot xrays 4/2020 negative bone/joint injury.    Review of Systems   Constitution: Negative for chills, diaphoresis, fever, malaise/fatigue and night sweats.   Cardiovascular: Negative for claudication, cyanosis, leg swelling and syncope.   Skin: Negative for color change, dry skin, nail changes, rash, suspicious lesions and unusual hair distribution.   Musculoskeletal: Positive for joint pain. Negative for falls, joint swelling, muscle cramps, muscle weakness and stiffness.   Gastrointestinal: Negative for constipation, diarrhea, nausea and vomiting.   Neurological: Negative for brief paralysis, disturbances in coordination, focal weakness, numbness, paresthesias, sensory change and tremors.           Objective:      Physical Exam  Constitutional:       General: He is not in acute distress.     Appearance: He is well-developed. He is not diaphoretic.   Cardiovascular:      Pulses:           Popliteal pulses are 2+ on the right side and 2+ on the left side.        Dorsalis pedis pulses are 2+ on the right side and 2+ on the left side.        Posterior tibial pulses are 2+ on the right side and 2+ on the left side.      Comments: Capillary refill 3 seconds all toes/distal feet, all toes/both feet warm to touch.      Negative lymphadenopathy bilateral popliteal fossa and tarsal tunnel.      Negavie lower extremity edema bilateral.    Musculoskeletal:      Right  ankle: He exhibits normal range of motion, no swelling, no ecchymosis, no deformity, no laceration and normal pulse. Achilles tendon normal. Achilles tendon exhibits no pain, no defect and normal Juan's test results.      Comments: Minimal, residual TTP anterior central ankle right at TNJ and ankle joint (ATFL) without signs of acute trauma, deformity, or loss of function.    Ankle right has negative anterior drawer sign, negative posterior drawer sign, negative external rotation test, negative squeeze test.    Ankle dorsiflexion decreased at <10 degrees bilateral with moderate increase with knee flexion bilateral.    Otherwise, Normal angle, base, station of gait. All ten toes without clubbing, cyanosis, or signs of ischemia.  No pain to palpation bilateral lower extremities.  Range of motion, stability, muscle strength, and muscle tone normal bilateral feet and legs.    Lymphadenopathy:      Lower Body: No right inguinal adenopathy. No left inguinal adenopathy.      Comments: Negative lymphadenopathy bilateral popliteal fossa and tarsal tunnel.    Negative lymphangitic streaking bilateral feet/ankles/legs.   Skin:     General: Skin is warm and dry.      Capillary Refill: Capillary refill takes 2 to 3 seconds.      Coloration: Skin is not pale.      Findings: No abrasion, bruising, burn, ecchymosis, erythema, laceration, lesion or rash.      Nails: There is no clubbing.        Comments:   Skin is normal age and health appropriate color, turgor, texture, and temperature bilateral lower extremities without ulceration, hyperpigmentation, discoloration, masses nodules or cords palpated.  No ecchymosis, erythema, edema, or cardinal signs of infection bilateral lower extremities.     Neurological:      Mental Status: He is alert and oriented to person, place, and time.      Sensory: No sensory deficit.      Motor: No tremor, atrophy or abnormal muscle tone.      Gait: Gait normal.      Deep Tendon Reflexes:       Reflex Scores:       Patellar reflexes are 2+ on the right side and 2+ on the left side.       Achilles reflexes are 2+ on the right side and 2+ on the left side.     Comments: Negative tinel sign to percussion sural, superficial peroneal, deep peroneal, saphenous, and posterior tibial nerves right and left ankles and feet.    Negative allodynia both feet/ankles.   Psychiatric:         Behavior: Behavior is cooperative.       Biomechanical Exam:  Non weight bearing assessment:   Right (degrees) Left (degrees)   Malleolar position 15-20 15-20   Ankle DF (knee extended) <10    <10         Ankle DF (knee flexed) <10       <10         Heel Inversion 20 20   Heel Eversion 10 10   STJ Neutral Position 0 0   Forefoot to rearfoot (1-5) perp perp   Forefoot to Rearfoot (2-5) perp perp   First Ray Dorsiflextion 5mm 5mm   First ray plantarflextion 5mm 5mm   First ray Neutral Position 0mm 0mm   Hallux Dorsiflexion 65 65   Hallux plantarflexion 30 30      Musculoskeletal evaluation, Range of Motion    Normal Limited Excessive pain   Ankle DF  R/L     Ankle PF R/L      STJ supination R/L      STJ pronation R/L      Hallux DF R/L      Hallux PF R/L      Lesser digits DF R/L      Lesser Digits PF R/L        Muscle Strength   Right Left   Gastrocnemius 5 5   Soleus 5 5   Tib. Posterior 5 5   FDL 5 5   FHL 5 5   FDB 5 5   Tib Anterior 5 5   EDL 5 5   EHL 5 5   EDB 5 5   Peroneus Longus 5 5   Peroneus Brevis 5 5     Foot Morphology    Varus  R L    Valgus  R L      Forefoot X X           Rearfoot      X X            Sagittal Plane Normal B/L    Transverse Plane Normal B/L    Ankle Morphology Equinus B/L                  Assessment:       Encounter Diagnoses   Name Primary?    Sprain of right ankle, unspecified ligament, subsequent encounter Yes    Equinus contracture of ankle     Foot pain, right     Acute right ankle pain          Plan:       Valeriar was seen today for ankle pain.    Diagnoses and all orders for this  visit:    Sprain of right ankle, unspecified ligament, subsequent encounter    Equinus contracture of ankle    Foot pain, right    Acute right ankle pain      I counseled the patient on his conditions, their implications and medical management.    Patient will stretch the tendo achilles complex three times daily as demonstrated in the office.  Literature was dispensed illustrating proper stretching technique.     Discussed conservative treatment with shoes of adequate dimensions, material, and style to alleviate symptoms and delay or prevent surgical intervention.     RIICE right ankle in neutral position protecting skin.    Continue nsaids, stretches, otc inserts.       After obtaining verbal consent, a steroid injection was performed at the anterolateral right ankle gutter and advancing medially to the anteromedial gutter using a mixture of 4mL of 2% plain Lidocaine and 4 mg of dexamethasone. This was well tolerated.        Patient was seen and assisted by resident Michaela Funes PGY2 under the direct supervision of attending Dr. Galindo.    Return to clinic prn

## 2021-03-11 ENCOUNTER — HOSPITAL ENCOUNTER (OUTPATIENT)
Dept: CARDIOLOGY | Facility: HOSPITAL | Age: 31
Discharge: HOME | End: 2021-03-11
Attending: PEDIATRICS
Payer: COMMERCIAL

## 2021-03-11 ENCOUNTER — OFFICE VISIT (OUTPATIENT)
Dept: CARDIOLOGY | Facility: CLINIC | Age: 31
End: 2021-03-11
Payer: COMMERCIAL

## 2021-03-11 VITALS
BODY MASS INDEX: 30.24 KG/M2 | DIASTOLIC BLOOD PRESSURE: 84 MMHG | WEIGHT: 216 LBS | HEART RATE: 79 BPM | SYSTOLIC BLOOD PRESSURE: 120 MMHG | WEIGHT: 206.13 LBS | HEIGHT: 71 IN | OXYGEN SATURATION: 96 % | BODY MASS INDEX: 28.86 KG/M2 | HEIGHT: 71 IN

## 2021-03-11 DIAGNOSIS — Q21.20 AV CANAL: ICD-10-CM

## 2021-03-11 DIAGNOSIS — Q24.9 ADULT CONGENITAL HEART DISEASE: Primary | Chronic | ICD-10-CM

## 2021-03-11 DIAGNOSIS — Q24.9 ADULT CONGENITAL HEART DISEASE: Primary | ICD-10-CM

## 2021-03-11 LAB
CV STRESS BASE HR: 67 BPM
DIASTOLIC BLOOD PRESSURE: 81 MMHG
OHS CV CPX 1 MINUTE RECOVERY HEART RATE: 115 BPM
OHS CV CPX 85 PERCENT MAX PREDICTED HEART RATE MALE: 161
OHS CV CPX ESTIMATED METS: 12
OHS CV CPX MAX PREDICTED HEART RATE: 189
OHS CV CPX PATIENT IS FEMALE: 0
OHS CV CPX PATIENT IS MALE: 1
OHS CV CPX PEAK DIASTOLIC BLOOD PRESSURE: 72 MMHG
OHS CV CPX PEAK HEAR RATE: 166 BPM
OHS CV CPX PEAK RATE PRESSURE PRODUCT: NORMAL
OHS CV CPX PEAK SYSTOLIC BLOOD PRESSURE: 180 MMHG
OHS CV CPX PERCENT MAX PREDICTED HEART RATE ACHIEVED: 88
OHS CV CPX RATE PRESSURE PRODUCT PRESENTING: 7973
STRESS ECHO POST EXERCISE DUR MIN: 7 MINUTES
STRESS ECHO POST EXERCISE DUR SEC: 5 SECONDS
SYSTOLIC BLOOD PRESSURE: 119 MMHG

## 2021-03-11 PROCEDURE — 99999 PR PBB SHADOW E&M-EST. PATIENT-LVL III: ICD-10-PCS | Mod: PBBFAC,,, | Performed by: PEDIATRICS

## 2021-03-11 PROCEDURE — 3079F DIAST BP 80-89 MM HG: CPT | Mod: CPTII,S$GLB,, | Performed by: PEDIATRICS

## 2021-03-11 PROCEDURE — 3079F PR MOST RECENT DIASTOLIC BLOOD PRESSURE 80-89 MM HG: ICD-10-PCS | Mod: CPTII,S$GLB,, | Performed by: PEDIATRICS

## 2021-03-11 PROCEDURE — 99999 PR PBB SHADOW E&M-EST. PATIENT-LVL III: CPT | Mod: PBBFAC,,, | Performed by: PEDIATRICS

## 2021-03-11 PROCEDURE — 93018 EXERCISE STRESS - EKG (CUPID ONLY): ICD-10-PCS | Mod: ,,, | Performed by: INTERNAL MEDICINE

## 2021-03-11 PROCEDURE — 3008F PR BODY MASS INDEX (BMI) DOCUMENTED: ICD-10-PCS | Mod: CPTII,S$GLB,, | Performed by: PEDIATRICS

## 2021-03-11 PROCEDURE — 99214 OFFICE O/P EST MOD 30 MIN: CPT | Mod: 25,S$GLB,, | Performed by: PEDIATRICS

## 2021-03-11 PROCEDURE — 93017 CV STRESS TEST TRACING ONLY: CPT

## 2021-03-11 PROCEDURE — 3008F BODY MASS INDEX DOCD: CPT | Mod: CPTII,S$GLB,, | Performed by: PEDIATRICS

## 2021-03-11 PROCEDURE — 99214 PR OFFICE/OUTPT VISIT, EST, LEVL IV, 30-39 MIN: ICD-10-PCS | Mod: 25,S$GLB,, | Performed by: PEDIATRICS

## 2021-03-11 PROCEDURE — 93018 CV STRESS TEST I&R ONLY: CPT | Mod: ,,, | Performed by: INTERNAL MEDICINE

## 2021-03-11 PROCEDURE — 1126F AMNT PAIN NOTED NONE PRSNT: CPT | Mod: S$GLB,,, | Performed by: PEDIATRICS

## 2021-03-11 PROCEDURE — 3074F PR MOST RECENT SYSTOLIC BLOOD PRESSURE < 130 MM HG: ICD-10-PCS | Mod: CPTII,S$GLB,, | Performed by: PEDIATRICS

## 2021-03-11 PROCEDURE — 93016 EXERCISE STRESS - EKG (CUPID ONLY): ICD-10-PCS | Mod: ,,, | Performed by: INTERNAL MEDICINE

## 2021-03-11 PROCEDURE — 3074F SYST BP LT 130 MM HG: CPT | Mod: CPTII,S$GLB,, | Performed by: PEDIATRICS

## 2021-03-11 PROCEDURE — 1126F PR PAIN SEVERITY QUANTIFIED, NO PAIN PRESENT: ICD-10-PCS | Mod: S$GLB,,, | Performed by: PEDIATRICS

## 2021-03-11 PROCEDURE — 93016 CV STRESS TEST SUPVJ ONLY: CPT | Mod: ,,, | Performed by: INTERNAL MEDICINE

## 2021-05-12 ENCOUNTER — OFFICE VISIT (OUTPATIENT)
Dept: SPINE | Facility: CLINIC | Age: 31
End: 2021-05-12
Attending: PHYSICAL MEDICINE & REHABILITATION
Payer: COMMERCIAL

## 2021-05-12 ENCOUNTER — HOSPITAL ENCOUNTER (OUTPATIENT)
Dept: RADIOLOGY | Facility: OTHER | Age: 31
Discharge: HOME | End: 2021-05-12
Attending: PHYSICAL MEDICINE & REHABILITATION
Payer: COMMERCIAL

## 2021-05-12 VITALS
SYSTOLIC BLOOD PRESSURE: 129 MMHG | BODY MASS INDEX: 28.86 KG/M2 | WEIGHT: 206.13 LBS | HEART RATE: 77 BPM | DIASTOLIC BLOOD PRESSURE: 79 MMHG | HEIGHT: 71 IN

## 2021-05-12 DIAGNOSIS — M54.2 NECK PAIN: Primary | ICD-10-CM

## 2021-05-12 DIAGNOSIS — M54.2 NECK PAIN: ICD-10-CM

## 2021-05-12 PROCEDURE — 1125F AMNT PAIN NOTED PAIN PRSNT: CPT | Mod: S$GLB,,, | Performed by: PHYSICAL MEDICINE & REHABILITATION

## 2021-05-12 PROCEDURE — 1125F PR PAIN SEVERITY QUANTIFIED, PAIN PRESENT: ICD-10-PCS | Mod: S$GLB,,, | Performed by: PHYSICAL MEDICINE & REHABILITATION

## 2021-05-12 PROCEDURE — 99999 PR PBB SHADOW E&M-EST. PATIENT-LVL III: ICD-10-PCS | Mod: PBBFAC,,, | Performed by: PHYSICAL MEDICINE & REHABILITATION

## 2021-05-12 PROCEDURE — 99999 PR PBB SHADOW E&M-EST. PATIENT-LVL III: CPT | Mod: PBBFAC,,, | Performed by: PHYSICAL MEDICINE & REHABILITATION

## 2021-05-12 PROCEDURE — 72050 X-RAY EXAM NECK SPINE 4/5VWS: CPT | Mod: 26,,, | Performed by: RADIOLOGY

## 2021-05-12 PROCEDURE — 99204 PR OFFICE/OUTPT VISIT, NEW, LEVL IV, 45-59 MIN: ICD-10-PCS | Mod: S$GLB,,, | Performed by: PHYSICAL MEDICINE & REHABILITATION

## 2021-05-12 PROCEDURE — 3008F BODY MASS INDEX DOCD: CPT | Mod: CPTII,S$GLB,, | Performed by: PHYSICAL MEDICINE & REHABILITATION

## 2021-05-12 PROCEDURE — 72050 XR CERVICAL SPINE AP LAT WITH FLEX EXTEN: ICD-10-PCS | Mod: 26,,, | Performed by: RADIOLOGY

## 2021-05-12 PROCEDURE — 72050 X-RAY EXAM NECK SPINE 4/5VWS: CPT | Mod: TC,FY

## 2021-05-12 PROCEDURE — 3008F PR BODY MASS INDEX (BMI) DOCUMENTED: ICD-10-PCS | Mod: CPTII,S$GLB,, | Performed by: PHYSICAL MEDICINE & REHABILITATION

## 2021-05-12 PROCEDURE — 99204 OFFICE O/P NEW MOD 45 MIN: CPT | Mod: S$GLB,,, | Performed by: PHYSICAL MEDICINE & REHABILITATION

## 2021-05-12 RX ORDER — DICLOFENAC SODIUM 75 MG/1
75 TABLET, DELAYED RELEASE ORAL 2 TIMES DAILY
Qty: 28 TABLET | Refills: 0 | Status: SHIPPED | OUTPATIENT
Start: 2021-05-12 | End: 2021-05-26

## 2021-06-29 ENCOUNTER — OFFICE VISIT (OUTPATIENT)
Dept: PODIATRY | Facility: CLINIC | Age: 31
End: 2021-06-29
Payer: COMMERCIAL

## 2021-06-29 VITALS
SYSTOLIC BLOOD PRESSURE: 121 MMHG | BODY MASS INDEX: 28.84 KG/M2 | HEART RATE: 76 BPM | HEIGHT: 71 IN | WEIGHT: 206 LBS | DIASTOLIC BLOOD PRESSURE: 71 MMHG

## 2021-06-29 DIAGNOSIS — M24.573 EQUINUS CONTRACTURE OF ANKLE: Primary | ICD-10-CM

## 2021-06-29 DIAGNOSIS — M79.672 FOOT PAIN, LEFT: ICD-10-CM

## 2021-06-29 DIAGNOSIS — M72.2 PLANTAR FASCIITIS: ICD-10-CM

## 2021-06-29 PROCEDURE — 1125F PR PAIN SEVERITY QUANTIFIED, PAIN PRESENT: ICD-10-PCS | Mod: S$GLB,,, | Performed by: PODIATRIST

## 2021-06-29 PROCEDURE — 99999 PR PBB SHADOW E&M-EST. PATIENT-LVL III: CPT | Mod: PBBFAC,,, | Performed by: PODIATRIST

## 2021-06-29 PROCEDURE — 29540 STRAPPING ANKLE &/FOOT: CPT | Mod: LT,S$GLB,, | Performed by: PODIATRIST

## 2021-06-29 PROCEDURE — 99214 PR OFFICE/OUTPT VISIT, EST, LEVL IV, 30-39 MIN: ICD-10-PCS | Mod: 25,S$GLB,, | Performed by: PODIATRIST

## 2021-06-29 PROCEDURE — 99214 OFFICE O/P EST MOD 30 MIN: CPT | Mod: 25,S$GLB,, | Performed by: PODIATRIST

## 2021-06-29 PROCEDURE — 3008F PR BODY MASS INDEX (BMI) DOCUMENTED: ICD-10-PCS | Mod: CPTII,S$GLB,, | Performed by: PODIATRIST

## 2021-06-29 PROCEDURE — 1125F AMNT PAIN NOTED PAIN PRSNT: CPT | Mod: S$GLB,,, | Performed by: PODIATRIST

## 2021-06-29 PROCEDURE — 29540 PR STRAPPING; ANKLE &/OR FOOT: ICD-10-PCS | Mod: LT,S$GLB,, | Performed by: PODIATRIST

## 2021-06-29 PROCEDURE — 3008F BODY MASS INDEX DOCD: CPT | Mod: CPTII,S$GLB,, | Performed by: PODIATRIST

## 2021-06-29 PROCEDURE — 99999 PR PBB SHADOW E&M-EST. PATIENT-LVL III: ICD-10-PCS | Mod: PBBFAC,,, | Performed by: PODIATRIST

## 2021-06-29 RX ORDER — LIDOCAINE HYDROCHLORIDE 20 MG/ML
JELLY TOPICAL
Qty: 30 ML | Refills: 2 | Status: SHIPPED | OUTPATIENT
Start: 2021-06-29

## 2021-10-15 ENCOUNTER — CLINICAL SUPPORT (OUTPATIENT)
Dept: URGENT CARE | Facility: CLINIC | Age: 31
End: 2021-10-15
Payer: COMMERCIAL

## 2021-10-15 DIAGNOSIS — Z11.59 SCREENING FOR VIRAL DISEASE: Primary | ICD-10-CM

## 2021-10-15 LAB
CTP QC/QA: YES
SARS-COV-2 RDRP RESP QL NAA+PROBE: NEGATIVE

## 2021-10-15 PROCEDURE — U0002: ICD-10-PCS | Mod: QW,S$GLB,, | Performed by: PHYSICIAN ASSISTANT

## 2021-10-15 PROCEDURE — U0002 COVID-19 LAB TEST NON-CDC: HCPCS | Mod: QW,S$GLB,, | Performed by: PHYSICIAN ASSISTANT

## 2021-10-29 ENCOUNTER — CLINICAL SUPPORT (OUTPATIENT)
Dept: URGENT CARE | Facility: CLINIC | Age: 31
End: 2021-10-29
Payer: COMMERCIAL

## 2021-10-29 DIAGNOSIS — Z11.59 SCREENING FOR VIRAL DISEASE: Primary | ICD-10-CM

## 2021-10-29 LAB
CTP QC/QA: YES
SARS-COV-2 RDRP RESP QL NAA+PROBE: NEGATIVE

## 2021-10-29 PROCEDURE — U0002 COVID-19 LAB TEST NON-CDC: HCPCS | Mod: QW,S$GLB,, | Performed by: NURSE PRACTITIONER

## 2021-10-29 PROCEDURE — U0002: ICD-10-PCS | Mod: QW,S$GLB,, | Performed by: NURSE PRACTITIONER

## 2021-11-03 ENCOUNTER — IMMUNIZATION (OUTPATIENT)
Dept: OBSTETRICS AND GYNECOLOGY | Facility: CLINIC | Age: 31
End: 2021-11-03
Payer: COMMERCIAL

## 2021-11-03 DIAGNOSIS — Z23 NEED FOR VACCINATION: Primary | ICD-10-CM

## 2021-11-03 PROCEDURE — 0001A COVID-19, MRNA, LNP-S, PF, 30 MCG/0.3 ML DOSE VACCINE: CPT | Mod: PBBFAC | Performed by: FAMILY MEDICINE

## 2021-11-30 ENCOUNTER — IMMUNIZATION (OUTPATIENT)
Dept: OBSTETRICS AND GYNECOLOGY | Facility: CLINIC | Age: 31
End: 2021-11-30
Payer: COMMERCIAL

## 2021-11-30 DIAGNOSIS — Z23 NEED FOR VACCINATION: Primary | ICD-10-CM

## 2021-11-30 PROCEDURE — 0002A COVID-19, MRNA, LNP-S, PF, 30 MCG/0.3 ML DOSE VACCINE: CPT | Mod: PBBFAC | Performed by: FAMILY MEDICINE

## 2021-11-30 PROCEDURE — 91300 COVID-19, MRNA, LNP-S, PF, 30 MCG/0.3 ML DOSE VACCINE: CPT | Mod: PBBFAC | Performed by: FAMILY MEDICINE

## 2021-12-31 ENCOUNTER — LAB VISIT (OUTPATIENT)
Dept: PRIMARY CARE CLINIC | Facility: OTHER | Age: 31
End: 2021-12-31
Attending: INTERNAL MEDICINE
Payer: COMMERCIAL

## 2021-12-31 DIAGNOSIS — Z20.822 ENCOUNTER FOR LABORATORY TESTING FOR COVID-19 VIRUS: ICD-10-CM

## 2021-12-31 PROCEDURE — U0003 INFECTIOUS AGENT DETECTION BY NUCLEIC ACID (DNA OR RNA); SEVERE ACUTE RESPIRATORY SYNDROME CORONAVIRUS 2 (SARS-COV-2) (CORONAVIRUS DISEASE [COVID-19]), AMPLIFIED PROBE TECHNIQUE, MAKING USE OF HIGH THROUGHPUT TECHNOLOGIES AS DESCRIBED BY CMS-2020-01-R: HCPCS | Performed by: INTERNAL MEDICINE

## 2022-01-01 ENCOUNTER — PATIENT MESSAGE (OUTPATIENT)
Dept: ADMINISTRATIVE | Facility: OTHER | Age: 32
End: 2022-01-01
Payer: COMMERCIAL

## 2022-01-03 LAB
SARS-COV-2 RNA RESP QL NAA+PROBE: NOT DETECTED
SARS-COV-2- CYCLE NUMBER: NORMAL

## 2022-06-21 ENCOUNTER — PATIENT MESSAGE (OUTPATIENT)
Dept: INTERNAL MEDICINE | Facility: CLINIC | Age: 32
End: 2022-06-21

## 2022-06-21 ENCOUNTER — OFFICE VISIT (OUTPATIENT)
Dept: INTERNAL MEDICINE | Facility: CLINIC | Age: 32
End: 2022-06-21
Payer: COMMERCIAL

## 2022-06-21 VITALS
HEART RATE: 60 BPM | TEMPERATURE: 98 F | HEIGHT: 71 IN | DIASTOLIC BLOOD PRESSURE: 80 MMHG | BODY MASS INDEX: 29.44 KG/M2 | WEIGHT: 210.31 LBS | OXYGEN SATURATION: 97 % | RESPIRATION RATE: 18 BRPM | SYSTOLIC BLOOD PRESSURE: 120 MMHG

## 2022-06-21 DIAGNOSIS — Q24.9 ADULT CONGENITAL HEART DISEASE: Chronic | ICD-10-CM

## 2022-06-21 DIAGNOSIS — M62.838 CERVICAL PARASPINOUS MUSCLE SPASM: Primary | ICD-10-CM

## 2022-06-21 PROCEDURE — 3079F DIAST BP 80-89 MM HG: CPT | Mod: CPTII,S$GLB,, | Performed by: INTERNAL MEDICINE

## 2022-06-21 PROCEDURE — 3074F SYST BP LT 130 MM HG: CPT | Mod: CPTII,S$GLB,, | Performed by: INTERNAL MEDICINE

## 2022-06-21 PROCEDURE — 99999 PR PBB SHADOW E&M-EST. PATIENT-LVL III: CPT | Mod: PBBFAC,,, | Performed by: INTERNAL MEDICINE

## 2022-06-21 PROCEDURE — 3074F PR MOST RECENT SYSTOLIC BLOOD PRESSURE < 130 MM HG: ICD-10-PCS | Mod: CPTII,S$GLB,, | Performed by: INTERNAL MEDICINE

## 2022-06-21 PROCEDURE — 99214 OFFICE O/P EST MOD 30 MIN: CPT | Mod: S$GLB,,, | Performed by: INTERNAL MEDICINE

## 2022-06-21 PROCEDURE — 3008F BODY MASS INDEX DOCD: CPT | Mod: CPTII,S$GLB,, | Performed by: INTERNAL MEDICINE

## 2022-06-21 PROCEDURE — 1160F PR REVIEW ALL MEDS BY PRESCRIBER/CLIN PHARMACIST DOCUMENTED: ICD-10-PCS | Mod: CPTII,S$GLB,, | Performed by: INTERNAL MEDICINE

## 2022-06-21 PROCEDURE — 3008F PR BODY MASS INDEX (BMI) DOCUMENTED: ICD-10-PCS | Mod: CPTII,S$GLB,, | Performed by: INTERNAL MEDICINE

## 2022-06-21 PROCEDURE — 1159F MED LIST DOCD IN RCRD: CPT | Mod: CPTII,S$GLB,, | Performed by: INTERNAL MEDICINE

## 2022-06-21 PROCEDURE — 99214 PR OFFICE/OUTPT VISIT, EST, LEVL IV, 30-39 MIN: ICD-10-PCS | Mod: S$GLB,,, | Performed by: INTERNAL MEDICINE

## 2022-06-21 PROCEDURE — 99999 PR PBB SHADOW E&M-EST. PATIENT-LVL III: ICD-10-PCS | Mod: PBBFAC,,, | Performed by: INTERNAL MEDICINE

## 2022-06-21 PROCEDURE — 1159F PR MEDICATION LIST DOCUMENTED IN MEDICAL RECORD: ICD-10-PCS | Mod: CPTII,S$GLB,, | Performed by: INTERNAL MEDICINE

## 2022-06-21 PROCEDURE — 3079F PR MOST RECENT DIASTOLIC BLOOD PRESSURE 80-89 MM HG: ICD-10-PCS | Mod: CPTII,S$GLB,, | Performed by: INTERNAL MEDICINE

## 2022-06-21 PROCEDURE — 1160F RVW MEDS BY RX/DR IN RCRD: CPT | Mod: CPTII,S$GLB,, | Performed by: INTERNAL MEDICINE

## 2022-06-21 RX ORDER — CYCLOBENZAPRINE HCL 10 MG
10 TABLET ORAL 3 TIMES DAILY PRN
Qty: 21 TABLET | Refills: 0 | Status: SHIPPED | OUTPATIENT
Start: 2022-06-21 | End: 2022-07-01

## 2022-06-21 RX ORDER — MELOXICAM 15 MG/1
15 TABLET ORAL DAILY
Qty: 30 TABLET | Refills: 0 | Status: SHIPPED | OUTPATIENT
Start: 2022-06-21

## 2022-06-21 NOTE — PROGRESS NOTES
[facility] Note    Chief Complaint      Chief Complaint   Patient presents with    Establ[facility]    Neck Pain       History of Present Illness      Zara Iniguez is a 32 y.o. male who presents today for   Chief Complaint   Patient presents with    [facility]    Neck Pain   .  Patient comes to appointment here for above . He describes as a spasm type pain and stiffness in neck . He is an active exerciser with basketball 3 times per week , cycling and body weight exercises . He states it does not bother him as much when playing basketball . He does sleep with firm pillow . He is followed by cardiology for congenital heart disease .    HPI    No problem-specific Assessment & Plan notes found for this encounter.       Problem List Items Addressed This Visit        Cardiac/Vascular    Adult congenital heart disease (Chronic)    Overview     Cards managing               Orthopedic    Cervical paraspinous muscle spasm - Primary    Overview     meloxicam 15 mg po daily disp 30  Cyclobenzaprine 10 mg po tid disp 15                   Past Medical History:  Past Medical History:   Diagnosis Date    Adult congenital heart disease 11/11/2016    VSD (ventricular septal defect)- repaired 11/12/2016       Past Surgical History:  History reviewed. No pertinent surgical history.    Family History:  family history includes No Known Problems in his brother, father, maternal aunt, maternal grandfather, maternal grandmother, maternal uncle, mother, paternal aunt, paternal grandfather, paternal grandmother, paternal uncle, and sister.     Social History:  Social History     Socioeconomic History    Marital status: Single   Tobacco Use    Smoking status: Never Smoker    Smokeless tobacco: Never Used   Substance and Sexual Activity    Alcohol use: Yes     Comment: socially    Drug use: No    Sexual activity: Yes     Partners: Female       Review of Systems:   Review of Systems  "  Constitutional: Negative for fever and weight loss.   HENT: Negative for congestion, hearing loss and sore throat.    Eyes: Negative for blurred vision.   Respiratory: Negative for cough and shortness of breath.    Cardiovascular: Negative for chest pain, palpitations, claudication and leg swelling.   Gastrointestinal: Negative for abdominal pain, constipation, diarrhea and heartburn.   Genitourinary: Negative for dysuria.   Musculoskeletal: Positive for joint pain and neck pain. Negative for back pain and myalgias.   Skin: Negative for rash.   Neurological: Negative for focal weakness and headaches.   Psychiatric/Behavioral: Negative for depression and suicidal ideas. The patient is not nervous/anxious.         Medications:  Outpatient Encounter Medications as of 6/21/2022   Medication Sig Dispense Refill    LIDOcaine HCL 2% (XYLOCAINE) 2 % jelly Apply topically as needed. Apply topically once nightly to affected part of foot/feet. (Patient not taking: Reported on 6/21/2022) 30 mL 2     No facility-administered encounter medications on file as of 6/21/2022.       Allergies:  Review of patient's allergies indicates:  No Known Allergies      Physical Exam      Vitals:    06/21/22 1119   BP: 120/80   Pulse: 60   Resp: 18   Temp: 97.7 °F (36.5 °C)        Vital Signs  Temp: 97.7 °F (36.5 °C)  Temp src: Oral  Pulse: 60  Resp: 18  SpO2: 97 %  BP: 120/80  BP Location: Right arm  Patient Position: Sitting  Pain Score: 0-No pain  Height and Weight  Height: 5' 11" (180.3 cm)  Weight: 95.4 kg (210 lb 5.1 oz)  BSA (Calculated - sq m): 2.19 sq meters  BMI (Calculated): 29.3  Weight in (lb) to have BMI = 25: 178.9]     Body mass index is 29.33 kg/m².    Physical Exam  Constitutional:       Appearance: He is well-developed.   HENT:      Head: Normocephalic.   Eyes:      Pupils: Pupils are equal, round, and reactive to light.   Neck:      Thyroid: No thyromegaly.   Cardiovascular:      Rate and Rhythm: Normal rate and regular " rhythm.      Heart sounds: Murmur heard.    Systolic murmur is present with a grade of 2/6.    No friction rub. No gallop.   Pulmonary:      Effort: Pulmonary effort is normal.      Breath sounds: Normal breath sounds.   Abdominal:      General: Bowel sounds are normal.      Palpations: Abdomen is soft.   Musculoskeletal:      Cervical back: Pain with movement present. Decreased range of motion.   Skin:     General: Skin is warm and dry.   Neurological:      Mental Status: He is alert and oriented to person, place, and time.      Sensory: No sensory deficit.   Psychiatric:         Behavior: Behavior normal.          Laboratory:  CBC:  No results for input(s): WBC, RBC, HGB, HCT, PLT, MCV, MCH, MCHC in the last 2160 hours.  CMP:  No results for input(s): GLU, CALCIUM, ALBUMIN, PROT, NA, K, CO2, CL, BUN, ALKPHOS, ALT, AST, BILITOT in the last 2160 hours.    Invalid input(s): CREATININ  URINALYSIS:  No results for input(s): COLORU, CLARITYU, SPECGRAV, PHUR, PROTEINUA, GLUCOSEU, BILIRUBINCON, BLOODU, WBCU, RBCU, BACTERIA, MUCUS, NITRITE, LEUKOCYTESUR, UROBILINOGEN, HYALINECASTS in the last 2160 hours.   LIPIDS:  No results for input(s): TSH, HDL, CHOL, TRIG, LDLCALC, CHOLHDL, NONHDLCHOL, TOTALCHOLEST in the last 2160 hours.  TSH:  No results for input(s): TSH in the last 2160 hours.  A1C:  No results for input(s): HGBA1C in the last 2160 hours.    Radiology:        Assessment:     Zara Iniguez is a 32 y.o.male with:    Cervical paraspinous muscle spasm    Adult congenital heart disease                Plan:     Problem List Items Addressed This Visit        Cardiac/Vascular    Adult congenital heart disease (Chronic)    Overview     Cards managing               Orthopedic    Cervical paraspinous muscle spasm - Primary    Overview     meloxicam 15 mg po daily disp 30  Cyclobenzaprine 10 mg po tid disp 15                  As above, continue current medications and maintain follow up with specialists.  Return to  clinic in 6  months.      [facility] [place]

## 2022-07-07 ENCOUNTER — PATIENT MESSAGE (OUTPATIENT)
Dept: INTERNAL MEDICINE | Facility: CLINIC | Age: 32
End: 2022-07-07
Payer: COMMERCIAL

## 2022-07-07 DIAGNOSIS — M62.838 CERVICAL PARASPINOUS MUSCLE SPASM: Primary | ICD-10-CM

## 2022-07-08 ENCOUNTER — TELEPHONE (OUTPATIENT)
Dept: ORTHOPEDICS | Facility: CLINIC | Age: 32
End: 2022-07-08
Payer: COMMERCIAL

## 2022-07-08 DIAGNOSIS — M50.30 DDD (DEGENERATIVE DISC DISEASE), CERVICAL: Primary | ICD-10-CM

## 2022-07-11 ENCOUNTER — OFFICE VISIT (OUTPATIENT)
Dept: ORTHOPEDICS | Facility: CLINIC | Age: 32
End: 2022-07-11
Payer: COMMERCIAL

## 2022-07-11 ENCOUNTER — HOSPITAL ENCOUNTER (OUTPATIENT)
Dept: RADIOLOGY | Facility: HOSPITAL | Age: 32
Discharge: HOME | End: 2022-07-11
Attending: REGISTERED NURSE
Payer: COMMERCIAL

## 2022-07-11 VITALS — HEIGHT: 71 IN | BODY MASS INDEX: 30 KG/M2 | WEIGHT: 214.31 LBS

## 2022-07-11 DIAGNOSIS — M50.30 DDD (DEGENERATIVE DISC DISEASE), CERVICAL: ICD-10-CM

## 2022-07-11 DIAGNOSIS — M62.838 CERVICAL PARASPINOUS MUSCLE SPASM: ICD-10-CM

## 2022-07-11 DIAGNOSIS — M54.2 NECK PAIN: Primary | ICD-10-CM

## 2022-07-11 PROCEDURE — 99204 PR OFFICE/OUTPT VISIT, NEW, LEVL IV, 45-59 MIN: ICD-10-PCS | Mod: S$GLB,,, | Performed by: REGISTERED NURSE

## 2022-07-11 PROCEDURE — 72050 XR CERVICAL SPINE AP LAT WITH FLEX EXTEN: ICD-10-PCS | Mod: 26,,, | Performed by: RADIOLOGY

## 2022-07-11 PROCEDURE — 99999 PR PBB SHADOW E&M-EST. PATIENT-LVL III: CPT | Mod: PBBFAC,,, | Performed by: REGISTERED NURSE

## 2022-07-11 PROCEDURE — 1159F MED LIST DOCD IN RCRD: CPT | Mod: CPTII,S$GLB,, | Performed by: REGISTERED NURSE

## 2022-07-11 PROCEDURE — 72050 X-RAY EXAM NECK SPINE 4/5VWS: CPT | Mod: TC

## 2022-07-11 PROCEDURE — 3008F BODY MASS INDEX DOCD: CPT | Mod: CPTII,S$GLB,, | Performed by: REGISTERED NURSE

## 2022-07-11 PROCEDURE — 99999 PR PBB SHADOW E&M-EST. PATIENT-LVL III: ICD-10-PCS | Mod: PBBFAC,,, | Performed by: REGISTERED NURSE

## 2022-07-11 PROCEDURE — 1159F PR MEDICATION LIST DOCUMENTED IN MEDICAL RECORD: ICD-10-PCS | Mod: CPTII,S$GLB,, | Performed by: REGISTERED NURSE

## 2022-07-11 PROCEDURE — 3008F PR BODY MASS INDEX (BMI) DOCUMENTED: ICD-10-PCS | Mod: CPTII,S$GLB,, | Performed by: REGISTERED NURSE

## 2022-07-11 PROCEDURE — 99204 OFFICE O/P NEW MOD 45 MIN: CPT | Mod: S$GLB,,, | Performed by: REGISTERED NURSE

## 2022-07-11 PROCEDURE — 72050 X-RAY EXAM NECK SPINE 4/5VWS: CPT | Mod: 26,,, | Performed by: RADIOLOGY

## 2022-07-11 RX ORDER — METHYLPREDNISOLONE 4 MG/1
TABLET ORAL
Qty: 1 EACH | Refills: 0 | Status: SHIPPED | OUTPATIENT
Start: 2022-07-11 | End: 2022-08-01

## 2022-07-11 RX ORDER — IBUPROFEN 800 MG/1
800 TABLET ORAL EVERY 6 HOURS PRN
Qty: 60 TABLET | Refills: 2 | Status: SHIPPED | OUTPATIENT
Start: 2022-07-11

## 2022-07-11 RX ORDER — METHOCARBAMOL 500 MG/1
1000 TABLET, FILM COATED ORAL 4 TIMES DAILY PRN
Qty: 80 TABLET | Refills: 2 | Status: SHIPPED | OUTPATIENT
Start: 2022-07-11 | End: 2022-08-10

## 2022-07-11 NOTE — PROGRESS NOTES
DATE: 7/11/2022  PATIENT: Zara Iniguez    Supervising Physician: Boone Ramirez M.D.    CHIEF COMPLAINT: neck pain    HISTORY:  Zara Iniguez is a 32 y.o. male here for initial evaluation of left neck pain (Neck - 1). The pain has been present for about 1 month. The patient describes the pain as stiffness. The pain is worse with neck rotation and improved by rest and heat. There is no associated numbness and tingling. There is no subjective weakness. Prior treatments have included advil, but no PT, ESIs or surgery.   The patient denies myelopathic symptoms such as handwriting changes or difficulty with buttons/coins/keys. Denies perineal paresthesias, bowel/bladder dysfunction.    PAST MEDICAL/SURGICAL HISTORY:  Past Medical History:   Diagnosis Date    Adult congenital heart disease 11/11/2016    VSD (ventricular septal defect)- repaired 11/12/2016     History reviewed. No pertinent surgical history.    Medications:  Current Outpatient Medications on File Prior to Visit   Medication Sig Dispense Refill    meloxicam (MOBIC) 15 MG tablet Take 1 tablet (15 mg total) by mouth once daily. 30 tablet 0    LIDOcaine HCL 2% (XYLOCAINE) 2 % jelly Apply topically as needed. Apply topically once nightly to affected part of foot/feet. (Patient not taking: No sig reported) 30 mL 2     No current facility-administered medications on file prior to visit.       Social History:   Social History     Socioeconomic History    Marital status: Single   Tobacco Use    Smoking status: Never Smoker    Smokeless tobacco: Never Used   Substance and Sexual Activity    Alcohol use: Yes     Comment: socially    Drug use: No    Sexual activity: Yes     Partners: Female       REVIEW OF SYSTEMS:  Constitution: Negative. Negative for chills, fever and night sweats.   Cardiovascular: Negative for chest pain and syncope.   Respiratory: Negative for cough and shortness of breath.   Gastrointestinal: See HPI. Negative for  "nausea/vomiting. Negative for abdominal pain.  Genitourinary: See HPI. Negative for discoloration or dysuria.  Skin: Negative for dry skin, itching and rash.   Hematologic/Lymphatic: Negative for bleeding problem. Does not bruise/bleed easily.   Musculoskeletal: Negative for falls and muscle weakness.   Neurological: See HPI. No seizures.   Endocrine: Negative for polydipsia, polyphagia and polyuria.   Allergic/Immunologic: Negative for hives and persistent infections.  Psychiatric/Behavioral: Negative for depression and insomnia.         EXAM:  Ht 5' 10.5" (1.791 m)   Wt 97.2 kg (214 lb 4.6 oz)   BMI 30.31 kg/m²     General: The patient is a very pleasant 32 y.o. male in no apparent distress, the patient is oriented to person, place and time.  Psych: Normal mood and affect  HEENT: Vision grossly intact, hearing intact to the spoken word.  Lungs: Respirations unlabored.  Gait: Normal station and gait, no difficulty with toe or heel walk.   Skin: Cervical skin negative for rashes, lesions, hairy patches and surgical scars.  Range of motion: Cervical range of motion is acceptable. There is mild tenderness to palpation.  Spinal Balance: Global saggital and coronal spinal balance acceptable, no significant for scoliosis and kyphosis.  Musculoskeletal: No pain with the range of motion of the bilateral shoulders and elbows. Normal bulk and contour of the bilateral hands.  Vascular: Bilateral hands warm and well perfused, radial pulses 2+ bilaterally.  Neurological: Normal strength and tone in all major motor groups in the bilateral upper and lower extremities. Normal sensation to light touch in the C5-T1 and L2-S1 dermatomes bilaterally.  Deep tendon reflexes symmetric 2+ in the bilateral upper and lower extremities.  Negative Inverted Radial Reflex and Jacob's bilaterally. Negative Babinski bilaterally.     IMAGING:   Today I personally reviewed AP, Lat and Flex/Ex  upright C-spine films that demonstrate reversal of " lordotic curve.      Body mass index is 30.31 kg/m².    No results found for: HGBA1C        ASSESSMENT/PLAN:    Zara was seen today for establish care and pain.    Diagnoses and all orders for this visit:    Neck pain  -     Ambulatory referral/consult to Physical/Occupational Therapy; Future  -     methylPREDNISolone (MEDROL DOSEPACK) 4 mg tablet; use as directed  -     methocarbamoL (ROBAXIN) 500 MG Tab; Take 2 tablets (1,000 mg total) by mouth 4 (four) times daily as needed (muscle pain).  -     ibuprofen (ADVIL,MOTRIN) 800 MG tablet; Take 1 tablet (800 mg total) by mouth every 6 (six) hours as needed for Pain.    Cervical paraspinous muscle spasm  -     Ambulatory referral/consult to Orthopedics        Today we discussed at length all of the different treatment options including anti-inflammatories, acetaminophen, rest, ice, heat, physical therapy including strengthening and stretching exercises, home exercises, ROM, aerobic conditioning, aqua therapy, other modalities including ultrasound, massage, and dry needling, epidural steroid injections and finally surgical intervention.  medrol dose pack, robaxin and ibuprofen sent to pharmacy. PT orders placed. The patient will follow up as needed.

## 2022-08-04 ENCOUNTER — CLINICAL SUPPORT (OUTPATIENT)
Dept: REHABILITATION | Facility: OTHER | Age: 32
End: 2022-08-04
Payer: COMMERCIAL

## 2022-08-04 DIAGNOSIS — M54.2 NECK PAIN: ICD-10-CM

## 2022-08-04 DIAGNOSIS — R29.3 POSTURE IMBALANCE: ICD-10-CM

## 2022-08-04 PROCEDURE — 97162 PT EVAL MOD COMPLEX 30 MIN: CPT | Mod: PN | Performed by: PHYSICAL THERAPIST

## 2022-08-04 NOTE — PLAN OF CARE
"[facility]  Physical Therapy Initial Evaluation    Name: Zara Iniguez  Clinic Number: [id number]    Therapy Diagnosis:   Encounter Diagnoses   Name Primary?    Neck pain     Posture imbalance      Physician: DARYL Francois FNP-C    Physician Orders: PT Eval and Treat: dry needling   Medical Diagnosis from Referral: M54.2 (ICD-10-CM) - Neck pain   Evaluation Date: 8/4/2022  Authorization Period Expiration: 7/11/2023 (auth pending)  Plan of Care Expiration: 10/28/2022  Progress Note Due: 9/4/2022  Visit # / Visits authorized: 1/ 1 (eval only)  FOTO: 1/ 3: 8/4/2022     Precautions: Standard    Time In: 1815  Time Out: 1850  Total Appointment Time (timed & untimed codes): 35 minutes    Subjective   Date of onset: 8 months  History of current condition - Amir reports: insidious onset of stiffness to neck. He says he would wake up with a "crick" in his neck, and then gradually resolve but then it was just lasting longer and longer. No falls, change in balance, weakness, change in handwriting, change in b/b function.        Past Medical History:   Diagnosis Date    Adult congenital heart disease 11/11/2016    VSD (ventricular septal defect)- repaired 11/12/2016     Zara Iniguez  has no past surgical history on file.    Zara has a current medication list which includes the following prescription(s): ibuprofen, lidocaine hcl 2%, meloxicam, and methocarbamol.    Review of patient's allergies indicates:  No Known Allergies     Imaging, none:     Prior Therapy: for ankle  Social History: Pt lives with their family  Occupation: warehouse, frequent lifting, carrying, walking   Prior Level of Function: I with ADL's and driving. Basketball and calisthenics for exercise  Current Level of Function: pain with driving. I with ADL's. Still playing basketball, trying to do calisthenics but has pain (with ab roller, pull ups, and push ups)    Pain:  Current 2/10, worst 8/10, best 0/10 "   Location: bilateral neck   Description: Aching, Dull and Stiff. Sometimes sharp   Aggravating Factors: Turning, calisthenics, driving, prolonged cervical flexion/extension   Easing Factors: heat, stretching, self-manipulation, compression, cold shower after working out    Pts goals: decrease pain and tightness to neck     Objective     Observations:  Pt is alert and oriented, good historian. Pt presents with upper crossed posture; head forward with rounded shoulders    Handedness (R: Right, L: Left): ambidextrous (writes with L, dominant with sports with R)    Shoulder screening: ROM WFL and equal B     Cervical AROM:   Cervical AROM Approximate degrees (Normal) Comments if applicable    Flexion  50 (50-60) Tight posterior CSP   Extension  50 (60-70) Pain mid CSP   R Rotation  60 (70-90) Tightness, min sharp pain R   L Rotation  70 (70-90) tightness   R Side bending  25 (20-45) Tight L   L Side bending 15 (20-45) Sharp pain L, tight R   Quadrant testing (+ or - for s/s):  Right - vs Left -      Muscle flexibility (Y: Yes, N: No)  - Upper trapezius muscle tightness with passive stretching: Y  - Levator scapulae musce tightness with passive stretching: Y      Upper Extremity Strength  (R) UE  (L) UE    Shoulder flexion: 5/5 Shoulder flexion: 5/5   Shoulder Abduction: 5/5 Shoulder abduction: 5/5   Shoulder ER 5/5 Shoulder ER 5/5   Shoulder IR 5/5 Shoulder IR 5/5   Elbow flexion: 5/5 Elbow flexion: 5/5   Elbow extension: 5/5 Elbow extension: 5/5   Wrist flexion: 5/5 Wrist flexion: 5/5   Wrist extension: 5/5 Wrist extension: 5/5   Lower Trap 4-/5 Lower Trap 4-/5   Upper Trap 5/5 Upper Trap 5/5   Rhomboids 4+/5 Rhomboids 4+/5       Dermatomes: WNL grossly to light touch       Myotomes: WFL Y/N:   - C4 (Shoulder shrug) = Y  - C5 (Shoulder abduction) = Y  - C6 (Elbow flexion/biceps and wrist extension) = Y  - C7 (Elbow extension/triceps and wrist flexion) = Y   - C8 (Thumb extension) = Y  - T1 (Finger abduction) =  "Y    Palpation: Pt presents with tenderness to palpation to suboccipitals, upper trap, levaot    Joint Mobility: CPA's stiff through mid CSP,    Transverse glides hypomobile R>L C3-5,      Special testing cluster exams:  - Criteria for cervical radiculopathy:        - Cervical Rotation AROM < 60*, relief with distraction, s/s with Spurlings, + ULTTA  - Met 0/4 criteria for cervical radiculopathy  Cervical myelopathy cluster:   - Gait deviations/coordination deficits, (+) Jacob, (+) Inverted supinator sign, (+) Babinski, Age>44 y/o   - Met 0/5 for cervical myelopathy (1/5 rule out, 3/5 rule in)    Upper Cervical Clearing Screening (+/-):  - Sharp-Blanco = -  - Alar ligament test = -  - Transverse ligament test = -      [place] C spine Rules:                CMS Impairment/Limitation/Restriction for FOTO Neck Survey    Therapist reviewed FOTO scores for Zara Laurent Hira on 8/4/2022.   FOTO documents entered into [employer] - see Media section.    Limitation Score: 41%    Goal: 29%         TREATMENT   Treatment Time In: 1835  Treatment Time Out: 1850  Total Treatment time separate from Evaluation: 15 minutes (not billed, eval only)    Amir received therapeutic exercises to develop flexibility and posture for 10 minutes including:  Reviewed and demonstrated for HEP:  Chin tucks 5" x 20  Scap squeezes 5" x 20  UT and LS stretches 3 x 30"    Amir received the following manual therapy techniques: Joint mobilizations were applied to the: CSP for 5 minutes, including:  HVLAT to mid CSP with audible cavitation noted      Home Exercises and Patient Education Provided    Education provided:   - Therapy rationale and plan of care    Written Home Exercises Provided: yes.  Exercises were reviewed and Amir was able to demonstrate them prior to the end of the session.  Amir demonstrated good  understanding of the education provided.     See EMR under Patient Instructions for exercises provided 8/4/2022.    Assessment   Amir is a 32 " y.o. male referred to outpatient Physical Therapy with a medical diagnosis of M54.2 (ICD-10-CM) - Neck pain. Pt presents with s/s consistent with referring diagnosis. Limited AROM noted to CSP in all planes. Hypomobility noted with lateral glides to mid CSP, R>L. Mild weakness to periscapular mm with MMT. HVLAT mobilization to CSP today with audible cavitation, some improvement noted to ROM noted following intervention.     Pt prognosis is Good.   Pt will benefit from skilled outpatient Physical Therapy to address the deficits stated above and in the chart below, provide pt/family education, and to maximize pt's level of independence.     Plan of care discussed with patient: Yes  Pt's spiritual, cultural and educational needs considered and patient is agreeable to the plan of care and goals as stated below:     Anticipated Barriers for therapy: work schedule    Medical Necessity is demonstrated by the following  History  Co-morbidities and personal factors that may impact the plan of care Co-morbidities:   difficulty sleeping and adult congenital heart disease    Personal Factors:   lifestyle     moderate   Examination  Body Structures and Functions, activity limitations and participation restrictions that may impact the plan of care Body Regions:   neck  trunk    Body Systems:    gross symmetry  ROM  strength  motor control  motor learning    Participation Restrictions:   Turning, recreational exercise, driving    Activity limitations:   Learning and applying knowledge  no deficits    General Tasks and Commands  no deficits    Communication  no deficits    Mobility  driving (bike, car, motorcycle)    Self care  no deficits    Domestic Life  no deficits    Interactions/Relationships  no deficits    Life Areas  no deficits    Community and Social Life  community life  recreation and leisure         moderate   Clinical Presentation evolving clinical presentation with changing clinical characteristics moderate    Decision Making/ Complexity Score: moderate     Goals:  Short Term Goals (4 Weeks):   1. Pt will report 20% reduction in pain of the cervical spine for ease with childcare.  2. PT will demonstrate 1/3 MMT improvement in periscapular strength for ease with upright posture.  3. Pt will demonstrate improved cervical spine ROM in all directions by 5 degrees for ease with driving.    Long Term Goals (12 Weeks):   1. Pt will report being independent with HEP for maintenance of improvements gained during therapy sessions  2. PT will report 50% reduction of pain of the neck for ease with driving.  3. Pt will demonstrate full BUE and periscapular strength without the provocation of pain for ease with return to PLOF with gym exercise.  4. Pt will demonstrate appropriate upright posture without external cueing for ease with community mobility and work tasks.     Plan   Plan of care Certification: 8/4/2022 to 10/28/2022.    Outpatient Physical Therapy 2 times weekly for 12 weeks to include the following interventions: Cervical/Lumbar Traction, Manual Therapy, Moist Heat/ Ice, Neuromuscular Re-ed, Patient Education, Self Care, Therapeutic Activities, Therapeutic Exercise and Dry needling.     Petrona Porras, PT

## 2022-08-05 PROBLEM — M54.2 NECK PAIN: Status: ACTIVE | Noted: 2022-08-05

## 2022-08-05 PROBLEM — R29.3 POSTURE IMBALANCE: Status: ACTIVE | Noted: 2022-08-05

## 2022-08-18 ENCOUNTER — CLINICAL SUPPORT (OUTPATIENT)
Dept: REHABILITATION | Facility: OTHER | Age: 32
End: 2022-08-18
Payer: COMMERCIAL

## 2022-08-18 DIAGNOSIS — M54.2 NECK PAIN: Primary | ICD-10-CM

## 2022-08-18 DIAGNOSIS — R29.3 POSTURE IMBALANCE: ICD-10-CM

## 2022-08-18 PROCEDURE — 97110 THERAPEUTIC EXERCISES: CPT | Mod: PN | Performed by: PHYSICAL THERAPIST

## 2022-08-18 NOTE — PROGRESS NOTES
"[facility]   Physical Therapy Treatment Note     Name: Zara Iniguez  Clinic Number: [id number]    Therapy Diagnosis:   Encounter Diagnoses   Name Primary?    Neck pain Yes    Posture imbalance      Physician: DARYL Francois NP    Visit Date: 8/18/2022    Physician Orders: PT Eval and Treat: dry needling   Medical Diagnosis from Referral: M54.2 (ICD-10-CM) - Neck pain   Evaluation Date: 8/4/2022  Authorization Period Expiration: 12/31/2022  Plan of Care Expiration: 10/28/2022  Progress Note Due: 9/4/2022  Visit # / Visits authorized: 2/ 21   FOTO: 1/ 3: 8/4/2022      Precautions: Standard      PTA Visit #: 0/5     Time In: 1418  Time Out: 1500  Total Billable Time: 42 minutes    SUBJECTIVE     Pt reports: felt some improvement after evaluation, not having any sharp pains only stiffness.  He was compliant with home exercise program.  Response to previous treatment: decreased pain  Functional change: no change    Pain: 3/10 (stiffness)  Location: bilateral neck      OBJECTIVE     Objective Measures updated at progress report unless specified.     Treatment     Zara received the treatments listed below:      therapeutic exercises to develop strength, endurance, ROM, flexibility, posture and core stabilization for 39 minutes including:    UBE x 4 min for postural cuing, subjective hx taken    1/2 foam roll   pec stretch x 2 min   Supine punch 20x 5#   B HA with GTB 20x   Uni HA GTB 20x   [place] GTB 20x    Prone   scap squeeze 10" x 10   Y 3" x 20   T 3" x 20   A 3" x 20    Isometric extension with chin tuck OTB 15x  Isometric lateral bending with chin tuck OTB 15x    manual therapy techniques: Joint mobilizations were applied to the: neck for 3 minutes, including:  3 min x HVLAT to mid CSP    May benefit from future trial of dry needling as needed        Patient Education and Home Exercises     Home Exercises Provided and Patient Education Provided     Education provided:   - Therex " rationale    Written Home Exercises Provided: yes. Exercises were reviewed and Zara was able to demonstrate them prior to the end of the session.  Valeriar demonstrated good  understanding of the education provided. See EMR under Patient Instructions for exercises provided during therapy sessions    ASSESSMENT     Good tolerance to initial treatment session. Audible cavitation noted with HVLAT to CSP, L>R. Mild c/o mm fatigue with prone exercises and isometrics, but able to complete. Good return demonstration with verbal and visual cuing for technique with therex.     Zara Is progressing well towards his goals.   Pt prognosis is Good.     Pt will continue to benefit from skilled outpatient physical therapy to address the deficits listed in the problem list box on initial evaluation, provide pt/family education and to maximize pt's level of independence in the home and community environment.     Pt's spiritual, cultural and educational needs considered and pt agreeable to plan of care and goals.     Anticipated barriers to physical therapy: work schedule     Goals:   Short Term Goals (4 Weeks):   1. Pt will report 20% reduction in pain of the cervical spine for ease with childcare. Progressing, not met  2. PT will demonstrate 1/3 MMT improvement in periscapular strength for ease with upright posture. Progressing, not met  3. Pt will demonstrate improved cervical spine ROM in all directions by 5 degrees for ease with driving. Progressing, not met     Long Term Goals (12 Weeks):   1. Pt will report being independent with HEP for maintenance of improvements gained during therapy sessions. Progressing, not met  2. PT will report 50% reduction of pain of the neck for ease with driving. Progressing, not met  3. Pt will demonstrate full BUE and periscapular strength without the provocation of pain for ease with return to PLOF with gym exercise. Progressing, not met  4. Pt will demonstrate appropriate upright posture without  external cueing for ease with community mobility and work tasks. Progressing, not met      PLAN   Plan of care Certification: 8/4/2022 to 10/28/2022.     Continue per plan of care with focus on postural stability and flexibility. Manual interventions as needed.     Petrona Porras, PT

## 2022-08-22 ENCOUNTER — TELEPHONE (OUTPATIENT)
Dept: PRIMARY CARE CLINIC | Facility: CLINIC | Age: 32
End: 2022-08-22
Payer: COMMERCIAL

## 2022-08-22 ENCOUNTER — OFFICE VISIT (OUTPATIENT)
Dept: PRIMARY CARE CLINIC | Facility: CLINIC | Age: 32
End: 2022-08-22
Payer: COMMERCIAL

## 2022-08-22 ENCOUNTER — PATIENT MESSAGE (OUTPATIENT)
Dept: PRIMARY CARE CLINIC | Facility: CLINIC | Age: 32
End: 2022-08-22

## 2022-08-22 ENCOUNTER — HOSPITAL ENCOUNTER (OUTPATIENT)
Dept: RADIOLOGY | Facility: HOSPITAL | Age: 32
Discharge: HOME | End: 2022-08-22
Attending: INTERNAL MEDICINE
Payer: COMMERCIAL

## 2022-08-22 VITALS
SYSTOLIC BLOOD PRESSURE: 120 MMHG | WEIGHT: 212.5 LBS | DIASTOLIC BLOOD PRESSURE: 80 MMHG | HEIGHT: 70 IN | BODY MASS INDEX: 30.42 KG/M2 | HEART RATE: 75 BPM | TEMPERATURE: 99 F | OXYGEN SATURATION: 99 % | RESPIRATION RATE: 18 BRPM

## 2022-08-22 DIAGNOSIS — R07.81 RIB PAIN ON RIGHT SIDE: ICD-10-CM

## 2022-08-22 DIAGNOSIS — R07.81 RIB PAIN ON RIGHT SIDE: Primary | ICD-10-CM

## 2022-08-22 PROCEDURE — 99213 OFFICE O/P EST LOW 20 MIN: CPT | Mod: S$GLB,,, | Performed by: INTERNAL MEDICINE

## 2022-08-22 PROCEDURE — 99213 PR OFFICE/OUTPT VISIT, EST, LEVL III, 20-29 MIN: ICD-10-PCS | Mod: S$GLB,,, | Performed by: INTERNAL MEDICINE

## 2022-08-22 PROCEDURE — 3079F DIAST BP 80-89 MM HG: CPT | Mod: CPTII,S$GLB,, | Performed by: INTERNAL MEDICINE

## 2022-08-22 PROCEDURE — 3074F SYST BP LT 130 MM HG: CPT | Mod: CPTII,S$GLB,, | Performed by: INTERNAL MEDICINE

## 2022-08-22 PROCEDURE — 99999 PR PBB SHADOW E&M-EST. PATIENT-LVL IV: ICD-10-PCS | Mod: PBBFAC,,, | Performed by: INTERNAL MEDICINE

## 2022-08-22 PROCEDURE — 3008F PR BODY MASS INDEX (BMI) DOCUMENTED: ICD-10-PCS | Mod: CPTII,S$GLB,, | Performed by: INTERNAL MEDICINE

## 2022-08-22 PROCEDURE — 3074F PR MOST RECENT SYSTOLIC BLOOD PRESSURE < 130 MM HG: ICD-10-PCS | Mod: CPTII,S$GLB,, | Performed by: INTERNAL MEDICINE

## 2022-08-22 PROCEDURE — 71101 XR RIBS MIN 3 VIEWS W/ PA CHEST RIGHT: ICD-10-PCS | Mod: 26,RT,, | Performed by: RADIOLOGY

## 2022-08-22 PROCEDURE — 99999 PR PBB SHADOW E&M-EST. PATIENT-LVL IV: CPT | Mod: PBBFAC,,, | Performed by: INTERNAL MEDICINE

## 2022-08-22 PROCEDURE — 71101 X-RAY EXAM UNILAT RIBS/CHEST: CPT | Mod: TC,PN,RT

## 2022-08-22 PROCEDURE — 3008F BODY MASS INDEX DOCD: CPT | Mod: CPTII,S$GLB,, | Performed by: INTERNAL MEDICINE

## 2022-08-22 PROCEDURE — 3079F PR MOST RECENT DIASTOLIC BLOOD PRESSURE 80-89 MM HG: ICD-10-PCS | Mod: CPTII,S$GLB,, | Performed by: INTERNAL MEDICINE

## 2022-08-22 PROCEDURE — 71101 X-RAY EXAM UNILAT RIBS/CHEST: CPT | Mod: 26,RT,, | Performed by: RADIOLOGY

## 2022-08-22 NOTE — PROGRESS NOTES
[facility] Progress Note  8/22/2022          Reason for Visit:      had concerns including Back Pain (Pt had a fall on Friday night  /Right side pain ).       History of Present Illness:     PT was playing basketball last Friday (3 days ago) - it was slippery/raining and he slipped and fell onto his back.  He initially had marking on his back from the fall - those appear to have resolved, but he is having pain in the posterior ribs on the right side.  Coughing/sneezing makes the pain worse, as does doing certain movements, like ab exercises, or scooting toward right while seated.  The pain is sharp when it occurs.  No dyspnea.      Problem List:     Patient Active Problem List   Diagnosis    Adult congenital heart disease    Plantar fasciitis of right foot    AV canal    Cervical paraspinous muscle spasm    Neck pain    Posture imbalance         Surgical History:     He has no past surgical history on file.      Family History:      His family history includes No Known Problems in his brother, father, maternal aunt, maternal grandfather, maternal grandmother, maternal uncle, mother, paternal aunt, paternal grandfather, paternal grandmother, paternal uncle, and sister.      Social History:     He reports that he has never smoked. He has never used smokeless tobacco. He reports current alcohol use. He reports that he does not use drugs.      Medications:       Current Outpatient Medications:     ibuprofen (ADVIL,MOTRIN) 800 MG tablet, Take 1 tablet (800 mg total) by mouth every 6 (six) hours as needed for Pain., Disp: 60 tablet, Rfl: 2    LIDOcaine HCL 2% (XYLOCAINE) 2 % jelly, Apply topically as needed. Apply topically once nightly to affected part of foot/feet., Disp: 30 mL, Rfl: 2    meloxicam (MOBIC) 15 MG tablet, Take 1 tablet (15 mg total) by mouth once daily., Disp: 30 tablet, Rfl: 0        Allergies:   He has No Known Allergies.      Review of Systems:     Review of Systems  "  Constitutional: Negative for chills and fever.   HENT: Negative for ear pain and sore throat.    Respiratory: Negative for cough, shortness of breath and wheezing.    Cardiovascular: Negative for chest pain and palpitations.   Gastrointestinal: Negative for constipation, diarrhea, nausea and vomiting.   Genitourinary: Negative for dysuria and hematuria.   Musculoskeletal: Negative for joint swelling and joint deformity.   Neurological: Negative for dizziness and weakness.           Physical Exam:     Temp:    99.1 °F (37.3 °C) (Oral)        Blood Pressure:  120/80        Pulse:   75     Respirations:  18  Weight:   96.4 kg (212 lb 8.4 oz)  Height:   5' 10" (1.778 m)  BMI:     Body mass index is 30.49 kg/m².    Physical Exam  Constitutional:       General: He is not in acute distress.  Cardiovascular:      Rate and Rhythm: Normal rate and regular rhythm.   Pulmonary:      Effort: Pulmonary effort is normal. No respiratory distress.      Breath sounds: No wheezing.   Musculoskeletal:      Comments: tendenress on palpation of brights in the posterior right side.  No point tnederness.  No contusion noted.   Skin:     General: Skin is warm.   Neurological:      General: No focal deficit present.      Mental Status: He is alert and oriented to person, place, and time.           Labs/Imaging/Testing:     Xray of right ribs today:  No rib fracture seen      Assessment and Plan:     1. Rib pain on right side  - XR Ribs Min 3 Views w/PA Chest Right; Future     Suspect contusion of ribs - Would expect this to be self-limited.  Discussed symptomatic treatment, observation.  RTC if no improvement or worsening       Zohaib Urbina MD  8/22/2022    This note was prepared using voice-recognition software.  Although efforts are made to proofread the note, some errors may persist in the final document.    "

## 2022-08-23 ENCOUNTER — CLINICAL SUPPORT (OUTPATIENT)
Dept: REHABILITATION | Facility: OTHER | Age: 32
End: 2022-08-23
Payer: COMMERCIAL

## 2022-08-23 DIAGNOSIS — M54.2 NECK PAIN: Primary | ICD-10-CM

## 2022-08-23 DIAGNOSIS — R29.3 POSTURE IMBALANCE: ICD-10-CM

## 2022-08-23 PROCEDURE — 97110 THERAPEUTIC EXERCISES: CPT | Mod: PN

## 2022-08-23 NOTE — PROGRESS NOTES
"[facility]   Physical Therapy Treatment Note     Name: Zara Iniguez  Clinic Number: [id number]    Therapy Diagnosis:   Encounter Diagnoses   Name Primary?    Neck pain Yes    Posture imbalance      Physician: DARYL Francois NP    Visit Date: 2022    Physician Orders: PT Eval and Treat: dry needling   Medical Diagnosis from Referral: M54.2 (ICD-10-CM) - Neck pain   Evaluation Date: 2022  Authorization Period Expiration: 2022  Plan of Care Expiration: 10/28/2022  Progress Note Due: 2022  Visit # / Visits authorized:    FOTO: 1/ 3: 2022      Precautions: Standard      PTA Visit #: 0/5     Time In: 2:30 - 15 min late for session  Time Out: 3:15  Total Billable Time: 45 minutes    SUBJECTIVE     Pt reports: stiffness in neck and occasional pinch pain with turning R/L.  He was compliant with home exercise program.  Response to previous treatment: decreased pain  Functional change: no change    Pain: 3/10 (stiffness)  Location: bilateral neck      OBJECTIVE     Objective Measures updated at progress report unless specified.     Treatment     Zara received the treatments listed below:      therapeutic exercises to develop strength, endurance, ROM, flexibility, posture and core stabilization for 45 minutes including:    **Exercises in BOLD performed today**    UBE x 4 min for postural cuing, subjective hx taken - NP today    +self SNAG 5 x 5" - THOMAS  +levator scap stretch 3 x 15" THOMAS    1/2 foam roll   pec stretch x 2 min   Supine punch 20x 5#   B HA with GTB 20x   Uni HA GTB 20x   [place] GTB 30x    Prone   scap squeeze 10" x 10   Y 3" x 20   T 3" x 20   A 3" x 20    Isometric extension with chin tuck OTB 20x  Isometric lateral bending with chin tuck OTB 20x    manual therapy techniques: Joint mobilizations were applied to the: neck for 00 minutes, includin min x HVLAT to mid CSP    May benefit from future trial of dry needling as needed        Patient " Education and Home Exercises     Home Exercises Provided and Patient Education Provided     Education provided:   - Therex rationale  - 8/23/22 - added self SNAG to HEP    Written Home Exercises Provided: yes. Exercises were reviewed and Zara was able to demonstrate them prior to the end of the session.  Amir demonstrated good  understanding of the education provided. See EMR under Patient Instructions for exercises provided during therapy sessions    ASSESSMENT     Added self SNAG for suboccipitals to improve c/o stiffness and cervical mobility. Pt notes reduced pain and improved cervical rotation after stretching. Added self SNAG to HEP. Good tolerance with overall therex today with appropriate training effect noted.    Amir Is progressing well towards his goals.   Pt prognosis is Good.     Pt will continue to benefit from skilled outpatient physical therapy to address the deficits listed in the problem list box on initial evaluation, provide pt/family education and to maximize pt's level of independence in the home and community environment.     Pt's spiritual, cultural and educational needs considered and pt agreeable to plan of care and goals.     Anticipated barriers to physical therapy: work schedule     Goals:   Short Term Goals (4 Weeks):   1. Pt will report 20% reduction in pain of the cervical spine for ease with childcare. Progressing, not met  2. PT will demonstrate 1/3 MMT improvement in periscapular strength for ease with upright posture. Progressing, not met  3. Pt will demonstrate improved cervical spine ROM in all directions by 5 degrees for ease with driving. Progressing, not met     Long Term Goals (12 Weeks):   1. Pt will report being independent with HEP for maintenance of improvements gained during therapy sessions. Progressing, not met  2. PT will report 50% reduction of pain of the neck for ease with driving. Progressing, not met  3. Pt will demonstrate full BUE and periscapular strength  without the provocation of pain for ease with return to PLOF with gym exercise. Progressing, not met  4. Pt will demonstrate appropriate upright posture without external cueing for ease with community mobility and work tasks. Progressing, not met      PLAN   Plan of care Certification: 8/4/2022 to 10/28/2022.     Continue per plan of care with focus on postural stability and flexibility. Manual interventions as needed.     Gabrielle Ellsworth, PT

## 2022-08-25 ENCOUNTER — CLINICAL SUPPORT (OUTPATIENT)
Dept: REHABILITATION | Facility: OTHER | Age: 32
End: 2022-08-25
Payer: COMMERCIAL

## 2022-08-25 DIAGNOSIS — M54.2 NECK PAIN: Primary | ICD-10-CM

## 2022-08-25 DIAGNOSIS — R29.3 POSTURE IMBALANCE: ICD-10-CM

## 2022-08-25 PROCEDURE — 97110 THERAPEUTIC EXERCISES: CPT | Mod: PN | Performed by: PHYSICAL THERAPIST

## 2022-08-25 NOTE — PROGRESS NOTES
"[facility]   Physical Therapy Treatment Note     Name: Zara Iniguez  Clinic Number: [id number]    Therapy Diagnosis:   Encounter Diagnoses   Name Primary?    Neck pain Yes    Posture imbalance      Physician: DARYL Francois NP    Visit Date: 8/25/2022    Physician Orders: PT Eval and Treat: dry needling   Medical Diagnosis from Referral: M54.2 (ICD-10-CM) - Neck pain   Evaluation Date: 8/4/2022  Authorization Period Expiration: 12/31/2022  Plan of Care Expiration: 10/28/2022  Progress Note Due: 9/4/2022  Visit # / Visits authorized: 4/ 21   FOTO: 1/ 3: 8/4/2022, 8/25/2022     Precautions: Standard      PTA Visit #: 0/5     Time In: 1430 (late arrival)  Time Out: 1500  Total Billable Time: 30 minutes    SUBJECTIVE     Pt reports: feeling good improvements. He says he's noticed that he's feeling a lot less stiff during the day. He says he still feels a little bit at end range with turning R>L, but only if he really pushes.   He was compliant with home exercise program.  Response to previous treatment: decreased pain  Functional change: no change    Pain: 1/10 (stiffness)  Location: bilateral neck      OBJECTIVE     Objective Measures updated at progress report unless specified.         Treatment     Zara received the treatments listed below:      therapeutic exercises to develop strength, endurance, ROM, flexibility, posture and core stabilization for 30 minutes including:    **Exercises in BOLD performed today**    UBE x 4 min for postural cuing, subjective hx taken - NP today    self SNAG 5 x 5" - THOMAS  levator scap stretch 3 x 15" THOMAS    1/2 foam roll   pec stretch x 2 min   Supine punch 20x 5#   B HA with GTB 20x   Uni HA GTB 20x   [place] GTB 30x    Prone   scap squeeze 10" x 10   Y 3" x 20   T 3" x 20   A 3" x 20    Isometric extension with chin tuck OTB 20x  Isometric lateral bending with chin tuck OTB 20x    manual therapy techniques: Joint mobilizations were applied to " the: neck for 00 minutes, includin min x HVLAT to mid CSP    May benefit from future trial of dry needling as needed        Patient Education and Home Exercises     Home Exercises Provided and Patient Education Provided     Education provided:   - Therex rationale  - 22 - added self SNAG to HEP    Written Home Exercises Provided: yes. Exercises were reviewed and Valreiar was able to demonstrate them prior to the end of the session.  Amir demonstrated good  understanding of the education provided. See EMR under Patient Instructions for exercises provided during therapy sessions    ASSESSMENT     Good tolerance to treatment today. Limited progression of therex 2* time constraints, but good training effect and carryover noted. Continues with difficulty with prone Y indicating lower trap weakness, but improving. Excellent improvement with FOTO limitation score today.     Amir Is progressing well towards his goals.   Pt prognosis is Good.     Pt will continue to benefit from skilled outpatient physical therapy to address the deficits listed in the problem list box on initial evaluation, provide pt/family education and to maximize pt's level of independence in the home and community environment.     Pt's spiritual, cultural and educational needs considered and pt agreeable to plan of care and goals.     Anticipated barriers to physical therapy: work schedule     Goals:   Short Term Goals (4 Weeks):   1. Pt will report 20% reduction in pain of the cervical spine for ease with childcare. Progressing, not met  2. PT will demonstrate 1/3 MMT improvement in periscapular strength for ease with upright posture. Progressing, not met  3. Pt will demonstrate improved cervical spine ROM in all directions by 5 degrees for ease with driving. Progressing, not met     Long Term Goals (12 Weeks):   1. Pt will report being independent with HEP for maintenance of improvements gained during therapy sessions. Progressing, not met  2.  PT will report 50% reduction of pain of the neck for ease with driving. Progressing, not met  3. Pt will demonstrate full BUE and periscapular strength without the provocation of pain for ease with return to PLOF with gym exercise. Progressing, not met  4. Pt will demonstrate appropriate upright posture without external cueing for ease with community mobility and work tasks. Progressing, not met      PLAN   Plan of care Certification: 8/4/2022 to 10/28/2022.     Continue per plan of care with focus on postural stability and flexibility. Manual interventions as needed.     Petrona Porras, PT

## 2022-08-30 ENCOUNTER — PATIENT MESSAGE (OUTPATIENT)
Dept: REHABILITATION | Facility: OTHER | Age: 32
End: 2022-08-30

## 2022-09-01 ENCOUNTER — CLINICAL SUPPORT (OUTPATIENT)
Dept: REHABILITATION | Facility: OTHER | Age: 32
End: 2022-09-01
Payer: COMMERCIAL

## 2022-09-01 DIAGNOSIS — M54.2 NECK PAIN: Primary | ICD-10-CM

## 2022-09-01 DIAGNOSIS — R29.3 POSTURE IMBALANCE: ICD-10-CM

## 2022-09-01 PROCEDURE — 97110 THERAPEUTIC EXERCISES: CPT | Mod: PN | Performed by: PHYSICAL THERAPIST

## 2022-09-01 NOTE — PROGRESS NOTES
[facility]   Physical Therapy Treatment Note     Name: Zara Iniguez  Clinic Number: [id number]    Therapy Diagnosis:   Encounter Diagnoses   Name Primary?    Neck pain Yes    Posture imbalance      Physician: DARYL Francois NP    Visit Date: 9/1/2022    Physician Orders: PT Eval and Treat: dry needling   Medical Diagnosis from Referral: M54.2 (ICD-10-CM) - Neck pain   Evaluation Date: 8/4/2022  Authorization Period Expiration: 12/31/2022  Plan of Care Expiration: 10/28/2022  Progress Note Due: 9/4/2022  Visit # / Visits authorized: 5/ 21   FOTO: 1/ 3: 8/4/2022, 8/25/2022     Precautions: Standard      PTA Visit #: 0/5     Time In: 1445 (late arrival)  Time Out: 1515  Total Billable Time: 30 minutes    SUBJECTIVE     Pt reports: neck is still feeling much better. He would like to screen his low back. He says low back pain flared a little when he first started therapy, but has felt better with strengthening his upper back. He reports general feeling of tightness and stiffness, particularly with trying to reach down to toes. He says he feels ready to discharge from therapy today.   He was compliant with home exercise program.  Response to previous treatment: decreased pain  Functional change: no change    Pain: 1/10 (stiffness)  Location: bilateral neck      OBJECTIVE     Objective Measures updated at progress report unless specified.     9/1/2022    Cervical ROM: grossly WFL in all planes. Min c/o stiffness at end range rotation, but motion is WFL.    Lumbar screen  ROM  Flexion: 75%, tightness reported to HS, calves   In supine - min limitation  Extension: WFL, tightness to LSP at end range  Lateral bending: WFL, tightness to contralateral side  Rotation WFL    Flexibility  HS: mod to marked B  Piriformis: mod B  Quads: slight B        Treatment     [place] received the treatments listed below:      therapeutic exercises to develop strength, endurance, ROM, flexibility, posture and  "core stabilization for 30 minutes including:    **Exercises in BOLD performed today**    UBE x 4 min for postural cuing, subjective hx taken - NP today    Reassessment and discharge. Lumbar screen and review of stretches for HEP    self SNAG 5 x 5" - THOMAS  levator scap stretch 3 x 15" THOMAS    1/2 foam roll   pec stretch x 2 min   Supine punch 20x 5#   B HA with GTB 20x   Uni HA GTB 20x   [place] GTB 30x    Prone   scap squeeze 10" x 10   Y 3" x 20   T 3" x 20   A 3" x 20    Isometric extension with chin tuck OTB 20x  Isometric lateral bending with chin tuck OTB 20x    manual therapy techniques: Joint mobilizations were applied to the: neck for 00 minutes, includin min x HVLAT to mid CSP    May benefit from future trial of dry needling as needed        Patient Education and Home Exercises     Home Exercises Provided and Patient Education Provided     Education provided:   - Therex rationale  - 22 - added self SNAG to HEP    Written Home Exercises Provided: yes. Exercises were reviewed and Amir was able to demonstrate them prior to the end of the session.  Amir demonstrated good  understanding of the education provided. See EMR under Patient Instructions for exercises provided during therapy sessions    ASSESSMENT     Pt has made excellent progress with therapy and has met all goals for cervical spine. Lumbar screen performed today per pt request. Mild limitations to lumbar ROM consistent with soft tissue restrictions with flexibility deficits noted to B HS and piriformis particularly. Pt educated on additional stretches for HEP to address these complaints. Based on progress and presentation with current episode, recommend discharge to independent [facility] at this time. Pt advised to seek further medical assessment and therapy referral as needed if low back pain does not improve or worsens.     Amir Is progressing well towards his goals.   Pt prognosis is Good.     Pt will continue to benefit from skilled " outpatient physical therapy to address the deficits listed in the problem list box on initial evaluation, provide pt/family education and to maximize pt's level of independence in the home and community environment.     Pt's spiritual, cultural and educational needs considered and pt agreeable to plan of care and goals.     Anticipated barriers to physical therapy: work schedule     Goals:   Short Term Goals (4 Weeks):   1. Pt will report 20% reduction in pain of the cervical spine for ease with childcare. Met 9/1/2022  2. PT will demonstrate 1/3 MMT improvement in periscapular strength for ease with upright posture. Met 9/1/2022  3. Pt will demonstrate improved cervical spine ROM in all directions by 5 degrees for ease with driving. Met 9/1/2022     Long Term Goals (12 Weeks):   1. Pt will report being independent with HEP for maintenance of improvements gained during therapy sessions. Met 9/1/2022  2. PT will report 50% reduction of pain of the neck for ease with driving. Met 9/1/2022  3. Pt will demonstrate full BUE and periscapular strength without the provocation of pain for ease with return to PLOF with gym exercise. Met 9/1/2022  4. Pt will demonstrate appropriate upright posture without external cueing for ease with community mobility and work tasks. Met 9/1/2022      PLAN   Plan of care Certification: 8/4/2022 to 10/28/2022.     Discharge to independent [facility]. Pt may benefit from future referral to [facility] program to address chronic lumbar stiffness.      Petrona Porras, PT

## 2022-09-01 NOTE — PLAN OF CARE
[facility]  Physical Therapy Discharge Note    Name: Zara Iniguez  Clinic Number: [id number]    Therapy Diagnosis:   Encounter Diagnoses   Name Primary?    Neck pain Yes    Posture imbalance      Physician: DARYL Francois NP    Physician Orders: PT Eval and Treat: dry needling   Medical Diagnosis from Referral: M54.2 (ICD-10-CM) - Neck pain   Evaluation Date: 8/4/2022      Date of Last visit: 9/1/2022  Total Visits Received: 5    ASSESSMENT      Pt has made excellent progress with therapy and has met all goals for cervical spine. Lumbar screen performed today per pt request. Mild limitations to lumbar ROM consistent with soft tissue restrictions with flexibility deficits noted to B HS and piriformis particularly. Pt educated on additional stretches for HEP to address these complaints. Based on progress and presentation with current episode, recommend discharge to independent [facility] at this time. Pt advised to seek further medical assessment and therapy referral as needed if low back pain does not improve or worsens.    Discharge reason: Patient is now asymptomatic    Discharge FOTO Score: 24%    Goals: all short and long term goals met    PLAN   This patient is discharged from Physical Therapy. May benefit from future [facility] referral to address lumbar concerns.       Petrona Porras, PT

## 2022-09-06 ENCOUNTER — PATIENT MESSAGE (OUTPATIENT)
Dept: PODIATRY | Facility: CLINIC | Age: 32
End: 2022-09-06

## 2022-09-06 ENCOUNTER — OFFICE VISIT (OUTPATIENT)
Dept: PODIATRY | Facility: CLINIC | Age: 32
End: 2022-09-06
Payer: COMMERCIAL

## 2022-09-06 VITALS
WEIGHT: 212 LBS | HEART RATE: 92 BPM | HEIGHT: 70 IN | DIASTOLIC BLOOD PRESSURE: 67 MMHG | SYSTOLIC BLOOD PRESSURE: 118 MMHG | BODY MASS INDEX: 30.35 KG/M2

## 2022-09-06 DIAGNOSIS — M79.671 FOOT PAIN, RIGHT: ICD-10-CM

## 2022-09-06 DIAGNOSIS — L60.0 INGROWN NAIL: Primary | ICD-10-CM

## 2022-09-06 PROCEDURE — 1160F PR REVIEW ALL MEDS BY PRESCRIBER/CLIN PHARMACIST DOCUMENTED: ICD-10-PCS | Mod: CPTII,S$GLB,, | Performed by: PODIATRIST

## 2022-09-06 PROCEDURE — 99999 PR PBB SHADOW E&M-EST. PATIENT-LVL III: CPT | Mod: PBBFAC,,, | Performed by: PODIATRIST

## 2022-09-06 PROCEDURE — 3078F DIAST BP <80 MM HG: CPT | Mod: CPTII,S$GLB,, | Performed by: PODIATRIST

## 2022-09-06 PROCEDURE — 3008F BODY MASS INDEX DOCD: CPT | Mod: CPTII,S$GLB,, | Performed by: PODIATRIST

## 2022-09-06 PROCEDURE — 1160F RVW MEDS BY RX/DR IN RCRD: CPT | Mod: CPTII,S$GLB,, | Performed by: PODIATRIST

## 2022-09-06 PROCEDURE — 99214 OFFICE O/P EST MOD 30 MIN: CPT | Mod: S$GLB,,, | Performed by: PODIATRIST

## 2022-09-06 PROCEDURE — 3008F PR BODY MASS INDEX (BMI) DOCUMENTED: ICD-10-PCS | Mod: CPTII,S$GLB,, | Performed by: PODIATRIST

## 2022-09-06 PROCEDURE — 3078F PR MOST RECENT DIASTOLIC BLOOD PRESSURE < 80 MM HG: ICD-10-PCS | Mod: CPTII,S$GLB,, | Performed by: PODIATRIST

## 2022-09-06 PROCEDURE — 3074F SYST BP LT 130 MM HG: CPT | Mod: CPTII,S$GLB,, | Performed by: PODIATRIST

## 2022-09-06 PROCEDURE — 3074F PR MOST RECENT SYSTOLIC BLOOD PRESSURE < 130 MM HG: ICD-10-PCS | Mod: CPTII,S$GLB,, | Performed by: PODIATRIST

## 2022-09-06 PROCEDURE — 99214 PR OFFICE/OUTPT VISIT, EST, LEVL IV, 30-39 MIN: ICD-10-PCS | Mod: S$GLB,,, | Performed by: PODIATRIST

## 2022-09-06 PROCEDURE — 1159F MED LIST DOCD IN RCRD: CPT | Mod: CPTII,S$GLB,, | Performed by: PODIATRIST

## 2022-09-06 PROCEDURE — 1159F PR MEDICATION LIST DOCUMENTED IN MEDICAL RECORD: ICD-10-PCS | Mod: CPTII,S$GLB,, | Performed by: PODIATRIST

## 2022-09-06 PROCEDURE — 99999 PR PBB SHADOW E&M-EST. PATIENT-LVL III: ICD-10-PCS | Mod: PBBFAC,,, | Performed by: PODIATRIST

## 2022-09-08 RX ORDER — TOBRAMYCIN 3 MG/ML
SOLUTION/ DROPS OPHTHALMIC
Qty: 5 ML | Refills: 3 | Status: SHIPPED | OUTPATIENT
Start: 2022-09-08

## 2022-09-08 NOTE — PROGRESS NOTES
Subjective:      Patient ID: Zara Iniguez is a 32 y.o. male.    Chief Complaint: Nail Problem (Toe nail pain)    Sharp throbbing pain right big toe/nail.  Gradual onset, worsening over past several weeks, aggravated by increased weight bearing, shoe gear, pressure.  No previous medical treatment.  OTC pain med not helping. Denies trauma, surgeyr.    Review of Systems   Constitutional: Negative for chills, diaphoresis, fever, malaise/fatigue and night sweats.   Cardiovascular:  Negative for claudication, cyanosis, leg swelling and syncope.   Skin:  Positive for nail changes. Negative for color change, dry skin, rash, suspicious lesions and unusual hair distribution.   Musculoskeletal:  Negative for falls, joint pain, joint swelling, muscle cramps, muscle weakness and stiffness.   Gastrointestinal:  Negative for constipation, diarrhea, nausea and vomiting.   Neurological:  Negative for brief paralysis, disturbances in coordination, focal weakness, numbness, paresthesias, sensory change and tremors.         Objective:      Physical Exam  Constitutional:       General: He is not in acute distress.     Appearance: He is well-developed. He is not diaphoretic.   Cardiovascular:      Pulses:           Popliteal pulses are 2+ on the right side and 2+ on the left side.        Dorsalis pedis pulses are 2+ on the right side and 2+ on the left side.        Posterior tibial pulses are 2+ on the right side and 2+ on the left side.      Comments: Capillary refill 3 seconds all toes/distal feet, all toes/both feet warm to touch.      Negative lymphadenopathy bilateral popliteal fossa and tarsal tunnel.      Negavie lower extremity edema bilateral.    Musculoskeletal:      Right ankle: No swelling, deformity, ecchymosis or lacerations. Normal range of motion. Normal pulse.      Right Achilles Tendon: Normal. No defects. Juan's test negative.   Lymphadenopathy:      Lower Body: No right inguinal adenopathy. No left inguinal  adenopathy.      Comments: Negative lymphadenopathy bilateral popliteal fossa and tarsal tunnel.    Negative lymphangitic streaking bilateral feet/ankles/legs.   Skin:     General: Skin is warm and dry.      Capillary Refill: Capillary refill takes 2 to 3 seconds.      Coloration: Skin is not pale.      Findings: No abrasion, bruising, burn, ecchymosis, erythema, laceration, lesion or rash.      Nails: There is no clubbing.      Comments:   Visible and palpable ingrowth of toenail lateral border right hallux with pain to palpation, and focal localized erythema and edema,  without ulceration, drainage, pus, tracking, fluctuance, malodor, or cardinal signs infection.     Neurological:      Mental Status: He is alert and oriented to person, place, and time.      Sensory: No sensory deficit.      Motor: No tremor, atrophy or abnormal muscle tone.      Gait: Gait normal.      Deep Tendon Reflexes:      Reflex Scores:       Patellar reflexes are 2+ on the right side and 2+ on the left side.       Achilles reflexes are 2+ on the right side and 2+ on the left side.  Psychiatric:         Behavior: Behavior is cooperative.           Assessment:       No diagnosis found.      Plan:       There are no diagnoses linked to this encounter.  I counseled the patient on his conditions, their implications and medical management.    Tobramycin drops bid right hallux.    Discussed conservative treatment with shoes of adequate dimensions, material, and style to alleviate symptoms and delay or prevent surgical intervention.    Cover right hallux all tiems band aid/similar.    Utilizing sterile toenail clippers I aggressively debrided the offending nail border approximately 3 mm from its edge and carried the nail plate incision down at an angle in order to wedge out the offending cryptotic portion of the nail plate. The offending border was then removed in toto. The area was cleansed with alcohol. Patient tolerated the procedure well and  related significant relief.          Follow up if symptoms worsen or fail to improve.

## 2023-02-06 ENCOUNTER — OFFICE VISIT (OUTPATIENT)
Dept: PODIATRY | Facility: CLINIC | Age: 33
End: 2023-02-06
Payer: COMMERCIAL

## 2023-02-06 ENCOUNTER — APPOINTMENT (OUTPATIENT)
Dept: RADIOLOGY | Facility: OTHER | Age: 33
End: 2023-02-06
Attending: PODIATRIST
Payer: COMMERCIAL

## 2023-02-06 VITALS
DIASTOLIC BLOOD PRESSURE: 82 MMHG | HEIGHT: 70 IN | BODY MASS INDEX: 30.35 KG/M2 | SYSTOLIC BLOOD PRESSURE: 128 MMHG | HEART RATE: 58 BPM | WEIGHT: 212 LBS

## 2023-02-06 DIAGNOSIS — S93.402A SPRAIN OF LEFT ANKLE, UNSPECIFIED LIGAMENT, INITIAL ENCOUNTER: Primary | ICD-10-CM

## 2023-02-06 DIAGNOSIS — M25.572 LEFT ANKLE PAIN, UNSPECIFIED CHRONICITY: Primary | ICD-10-CM

## 2023-02-06 DIAGNOSIS — M25.572 PAIN OF JOINT OF LEFT ANKLE AND FOOT: ICD-10-CM

## 2023-02-06 DIAGNOSIS — M25.572 LEFT ANKLE PAIN, UNSPECIFIED CHRONICITY: ICD-10-CM

## 2023-02-06 PROCEDURE — 99213 OFFICE O/P EST LOW 20 MIN: CPT | Mod: S$GLB,,, | Performed by: PODIATRIST

## 2023-02-06 PROCEDURE — 3074F SYST BP LT 130 MM HG: CPT | Mod: CPTII,S$GLB,, | Performed by: PODIATRIST

## 2023-02-06 PROCEDURE — 3079F PR MOST RECENT DIASTOLIC BLOOD PRESSURE 80-89 MM HG: ICD-10-PCS | Mod: CPTII,S$GLB,, | Performed by: PODIATRIST

## 2023-02-06 PROCEDURE — 1159F MED LIST DOCD IN RCRD: CPT | Mod: CPTII,S$GLB,, | Performed by: PODIATRIST

## 2023-02-06 PROCEDURE — 99999 PR PBB SHADOW E&M-EST. PATIENT-LVL IV: ICD-10-PCS | Mod: PBBFAC,,, | Performed by: PODIATRIST

## 2023-02-06 PROCEDURE — 1159F PR MEDICATION LIST DOCUMENTED IN MEDICAL RECORD: ICD-10-PCS | Mod: CPTII,S$GLB,, | Performed by: PODIATRIST

## 2023-02-06 PROCEDURE — 73610 X-RAY EXAM OF ANKLE: CPT | Mod: 26,LT,, | Performed by: RADIOLOGY

## 2023-02-06 PROCEDURE — 1160F PR REVIEW ALL MEDS BY PRESCRIBER/CLIN PHARMACIST DOCUMENTED: ICD-10-PCS | Mod: CPTII,S$GLB,, | Performed by: PODIATRIST

## 2023-02-06 PROCEDURE — 73610 X-RAY EXAM OF ANKLE: CPT | Mod: TC,LT

## 2023-02-06 PROCEDURE — 3008F PR BODY MASS INDEX (BMI) DOCUMENTED: ICD-10-PCS | Mod: CPTII,S$GLB,, | Performed by: PODIATRIST

## 2023-02-06 PROCEDURE — 3074F PR MOST RECENT SYSTOLIC BLOOD PRESSURE < 130 MM HG: ICD-10-PCS | Mod: CPTII,S$GLB,, | Performed by: PODIATRIST

## 2023-02-06 PROCEDURE — 3079F DIAST BP 80-89 MM HG: CPT | Mod: CPTII,S$GLB,, | Performed by: PODIATRIST

## 2023-02-06 PROCEDURE — 73610 XR ANKLE COMPLETE 3 VIEW LEFT: ICD-10-PCS | Mod: 26,LT,, | Performed by: RADIOLOGY

## 2023-02-06 PROCEDURE — 3008F BODY MASS INDEX DOCD: CPT | Mod: CPTII,S$GLB,, | Performed by: PODIATRIST

## 2023-02-06 PROCEDURE — 1160F RVW MEDS BY RX/DR IN RCRD: CPT | Mod: CPTII,S$GLB,, | Performed by: PODIATRIST

## 2023-02-06 PROCEDURE — 99213 PR OFFICE/OUTPT VISIT, EST, LEVL III, 20-29 MIN: ICD-10-PCS | Mod: S$GLB,,, | Performed by: PODIATRIST

## 2023-02-06 PROCEDURE — 99999 PR PBB SHADOW E&M-EST. PATIENT-LVL IV: CPT | Mod: PBBFAC,,, | Performed by: PODIATRIST

## 2023-02-06 NOTE — PROGRESS NOTES
"Subjective:        History of Present Illness  Zara Iniguez is a 32 y.o. male who presents with left ankle pain. Onset of the symptoms was yesterday. Inciting event:  landing on foot playing basketball . Current symptoms include: ability to bear weight, but with some pain. Aggravating factors: direct pressure and walking . Symptoms have been well-controlled. Patient has had prior ankle problems. Evaluation to date: none. Treatment to date:  CAM and ice .    Patient Active Problem List   Diagnosis    Adult congenital heart disease    Plantar fasciitis of right foot    AV canal    Cervical paraspinous muscle spasm         Current Outpatient Medications on File Prior to Visit   Medication Sig Dispense Refill    ibuprofen (ADVIL,MOTRIN) 800 MG tablet Take 1 tablet (800 mg total) by mouth every 6 (six) hours as needed for Pain. (Patient not taking: Reported on 2/6/2023) 60 tablet 2    LIDOcaine HCL 2% (XYLOCAINE) 2 % jelly Apply topically as needed. Apply topically once nightly to affected part of foot/feet. (Patient not taking: Reported on 2/6/2023) 30 mL 2    meloxicam (MOBIC) 15 MG tablet Take 1 tablet (15 mg total) by mouth once daily. (Patient not taking: Reported on 2/6/2023) 30 tablet 0    tobramycin sulfate 0.3% (TOBREX) 0.3 % ophthalmic solution 1-2 drops topically twice daily to affected toe(s). (Patient not taking: Reported on 2/6/2023) 5 mL 3     No current facility-administered medications on file prior to visit.         Review of patient's allergies indicates:  No Known Allergies        Review of Systems:  Vascular: negative for cramps, edema and bruising  Musculoskeletal: negative for joint paint and joint edema  Skin: negative for rashes and lesions  Neurological: negative for burning, tingling and numbness  Gastrointestinal: negative for stomach pain, nausea and vomiting        Objective:      /82   Pulse (!) 58   Ht 5' 10" (1.778 m)   Wt 96.2 kg (212 lb)   BMI 30.42 kg/m² "   Exam:  Vascular: dorsalis pedis pulse normal bilaterally, posterior tibial pluse normal bilaterally, capillary refill normal bilaterally, hair growth present bilaterally, coloration normal, edema trace left ankle.   Neurological: vibratory present bilaterally, semms present bilaterally, reflexes normal bilaterally, proprioception present bilaterally and Tinels absent  Dermatological: texture and turgor well hydrated, pigment changes absent bilaterally, nails normal, scars absent bilaterally, erythematous absent bilaterally, drainage absent bilaterally, lesions and wounds absent bilaterally   Orthopedic: medial and lateral tenderness on right with inversion and eversion,  range of movement nonpainful, and strength normal          Assessment:        1. Sprain of left ankle, unspecified ligament, initial encounter    2. Pain of joint of left ankle and foot            Plan:   I counseled the patient on the patient's conditions, their implications and medical management.    Rest, ice, compression, elevation (RICE) therapy.  Continue CAM boot.    Follow up three weeks.       I spent a total of 25 minutes on the day of the visit.This includes face to face time and non-face to face time preparing to see the patient (eg, review of tests), obtaining and/or reviewing separately obtained history, documenting clinical information in the electronic or other health record, independently interpreting results and communicating results to the patient/family/caregiver, or care coordinator.

## 2023-02-27 ENCOUNTER — OFFICE VISIT (OUTPATIENT)
Dept: PODIATRY | Facility: CLINIC | Age: 33
End: 2023-02-27
Payer: COMMERCIAL

## 2023-02-27 VITALS
HEART RATE: 75 BPM | SYSTOLIC BLOOD PRESSURE: 147 MMHG | DIASTOLIC BLOOD PRESSURE: 85 MMHG | HEIGHT: 70 IN | WEIGHT: 212 LBS | BODY MASS INDEX: 30.35 KG/M2

## 2023-02-27 DIAGNOSIS — M25.572 PAIN OF JOINT OF LEFT ANKLE AND FOOT: ICD-10-CM

## 2023-02-27 DIAGNOSIS — S93.402A SPRAIN OF LEFT ANKLE, UNSPECIFIED LIGAMENT, INITIAL ENCOUNTER: Primary | ICD-10-CM

## 2023-02-27 PROCEDURE — 99214 OFFICE O/P EST MOD 30 MIN: CPT | Mod: S$GLB,,, | Performed by: PODIATRIST

## 2023-02-27 PROCEDURE — 3079F DIAST BP 80-89 MM HG: CPT | Mod: CPTII,S$GLB,, | Performed by: PODIATRIST

## 2023-02-27 PROCEDURE — 3077F SYST BP >= 140 MM HG: CPT | Mod: CPTII,S$GLB,, | Performed by: PODIATRIST

## 2023-02-27 PROCEDURE — 1160F RVW MEDS BY RX/DR IN RCRD: CPT | Mod: CPTII,S$GLB,, | Performed by: PODIATRIST

## 2023-02-27 PROCEDURE — 1160F PR REVIEW ALL MEDS BY PRESCRIBER/CLIN PHARMACIST DOCUMENTED: ICD-10-PCS | Mod: CPTII,S$GLB,, | Performed by: PODIATRIST

## 2023-02-27 PROCEDURE — 99999 PR PBB SHADOW E&M-EST. PATIENT-LVL III: ICD-10-PCS | Mod: PBBFAC,,, | Performed by: PODIATRIST

## 2023-02-27 PROCEDURE — 3008F BODY MASS INDEX DOCD: CPT | Mod: CPTII,S$GLB,, | Performed by: PODIATRIST

## 2023-02-27 PROCEDURE — 99214 PR OFFICE/OUTPT VISIT, EST, LEVL IV, 30-39 MIN: ICD-10-PCS | Mod: S$GLB,,, | Performed by: PODIATRIST

## 2023-02-27 PROCEDURE — 1159F PR MEDICATION LIST DOCUMENTED IN MEDICAL RECORD: ICD-10-PCS | Mod: CPTII,S$GLB,, | Performed by: PODIATRIST

## 2023-02-27 PROCEDURE — 3008F PR BODY MASS INDEX (BMI) DOCUMENTED: ICD-10-PCS | Mod: CPTII,S$GLB,, | Performed by: PODIATRIST

## 2023-02-27 PROCEDURE — 99999 PR PBB SHADOW E&M-EST. PATIENT-LVL III: CPT | Mod: PBBFAC,,, | Performed by: PODIATRIST

## 2023-02-27 PROCEDURE — 3077F PR MOST RECENT SYSTOLIC BLOOD PRESSURE >= 140 MM HG: ICD-10-PCS | Mod: CPTII,S$GLB,, | Performed by: PODIATRIST

## 2023-02-27 PROCEDURE — 3079F PR MOST RECENT DIASTOLIC BLOOD PRESSURE 80-89 MM HG: ICD-10-PCS | Mod: CPTII,S$GLB,, | Performed by: PODIATRIST

## 2023-02-27 PROCEDURE — 1159F MED LIST DOCD IN RCRD: CPT | Mod: CPTII,S$GLB,, | Performed by: PODIATRIST

## 2023-02-27 NOTE — PROGRESS NOTES
"Subjective:        History of Present Illness  Zara Iniguez is a 32 y.o. male who presents for follow up  left ankle pain.   Onset of the symptoms was about three weeks ago.  Pain much better today.  Doing some stretching exercises that he remembered from Physical therapy.   He is wearing a soft ankle brace.   MRI done.  Here for review.         Patient Active Problem List   Diagnosis    Adult congenital heart disease    Plantar fasciitis of right foot    AV canal    Cervical paraspinous muscle spasm         Current Outpatient Medications on File Prior to Visit   Medication Sig Dispense Refill    ibuprofen (ADVIL,MOTRIN) 800 MG tablet Take 1 tablet (800 mg total) by mouth every 6 (six) hours as needed for Pain. 60 tablet 2    LIDOcaine HCL 2% (XYLOCAINE) 2 % jelly Apply topically as needed. Apply topically once nightly to affected part of foot/feet. 30 mL 2    meloxicam (MOBIC) 15 MG tablet Take 1 tablet (15 mg total) by mouth once daily. 30 tablet 0    tobramycin sulfate 0.3% (TOBREX) 0.3 % ophthalmic solution 1-2 drops topically twice daily to affected toe(s). 5 mL 3     No current facility-administered medications on file prior to visit.         Review of patient's allergies indicates:  No Known Allergies        Review of Systems:  Vascular: negative for cramps, edema and bruising  Musculoskeletal: negative for joint paint and joint edema  Skin: negative for rashes and lesions  Neurological: negative for burning, tingling and numbness  Gastrointestinal: negative for stomach pain, nausea and vomiting        Objective:      BP (!) 147/85   Pulse 75   Ht 5' 10" (1.778 m)   Wt 96.2 kg (212 lb)   BMI 30.42 kg/m²   Exam:  Vascular: dorsalis pedis pulse normal bilaterally, posterior tibial pluse normal bilaterally, capillary refill normal bilaterally, hair growth present bilaterally, coloration normal, edema trace left ankle.     Neurological: vibratory present bilaterally, semms present bilaterally, reflexes " normal bilaterally, proprioception present bilaterally and Tinels absent    Dermatological: texture and turgor well hydrated, pigment changes absent bilaterally, nails normal, scars absent bilaterally, erythematous absent bilaterally, drainage absent bilaterally, lesions and wounds absent bilaterally     Orthopedic: no medial and lateral tenderness on right with inversion and eversion,  range of movement nonpainful, and strength normal; no swelling or increased temperature.         2/27/2023  MRI LEFT FOOT: Impression:     1.  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     2.  Abnormal tissue in the anterolateral distal tibiofibular gutter that could contribute to symptoms of anterolateral impingement.     Dorsal and lateral talonavicular capsular thickening and or synovitis that may reflect sequela of remote trauma and or early degenerative change.        Assessment:        1. Sprain of left ankle, unspecified ligament, initial encounter    2. Pain of joint of left ankle and foot              Plan:   I counseled the patient on the patient's conditions, their implications and medical management.   MRI reviewed.   Okay to continue stretching.   Firmer ankle brace/support.   High top shoes.  Avoid high impact activities for now.   Surgical consultation with Dr. Graham if surgery may be needed in the future.       I spent a total of 30 minutes on the day of the visit.  This includes face to face time and non-face to face time preparing to see the patient (eg, review of tests), obtaining and/or reviewing separately obtained history, documenting clinical information in the electronic or other health record, independently interpreting results and communicating results to the patient/family/caregiver, or care coordinator.

## 2023-05-02 ENCOUNTER — TELEPHONE (OUTPATIENT)
Dept: ORTHOPEDICS | Facility: HOSPITAL | Age: 33
End: 2023-05-02
Payer: COMMERCIAL

## 2023-05-02 NOTE — TELEPHONE ENCOUNTER
Called to reschedule patients appointment today due to no show, unable to leave voicemail as inbox is full.    Harpreet Rutherford MS, OTC   Sports Medicine Assistant   [facility]  (P) [phone]  (F) [phone]    
done

## 2023-10-14 ENCOUNTER — OFFICE VISIT (OUTPATIENT)
Dept: INTERNAL MEDICINE | Facility: CLINIC | Age: 33
End: 2023-10-14
Payer: COMMERCIAL

## 2023-10-14 VITALS
HEART RATE: 69 BPM | WEIGHT: 201.5 LBS | DIASTOLIC BLOOD PRESSURE: 80 MMHG | BODY MASS INDEX: 28.85 KG/M2 | HEIGHT: 70 IN | RESPIRATION RATE: 18 BRPM | TEMPERATURE: 98 F | OXYGEN SATURATION: 97 % | SYSTOLIC BLOOD PRESSURE: 120 MMHG

## 2023-10-14 DIAGNOSIS — Z11.4 ENCOUNTER FOR SCREENING FOR HIV: ICD-10-CM

## 2023-10-14 DIAGNOSIS — Z00.00 ANNUAL PHYSICAL EXAM: Primary | ICD-10-CM

## 2023-10-14 DIAGNOSIS — Z11.59 ENCOUNTER FOR HEPATITIS C SCREENING TEST FOR LOW RISK PATIENT: ICD-10-CM

## 2023-10-14 PROCEDURE — 1160F RVW MEDS BY RX/DR IN RCRD: CPT | Mod: CPTII,S$GLB,, | Performed by: INTERNAL MEDICINE

## 2023-10-14 PROCEDURE — 99395 PREV VISIT EST AGE 18-39: CPT | Mod: S$GLB,,, | Performed by: INTERNAL MEDICINE

## 2023-10-14 PROCEDURE — 3008F BODY MASS INDEX DOCD: CPT | Mod: CPTII,S$GLB,, | Performed by: INTERNAL MEDICINE

## 2023-10-14 PROCEDURE — 3079F DIAST BP 80-89 MM HG: CPT | Mod: CPTII,S$GLB,, | Performed by: INTERNAL MEDICINE

## 2023-10-14 PROCEDURE — 99999 PR PBB SHADOW E&M-EST. PATIENT-LVL IV: CPT | Mod: PBBFAC,,, | Performed by: INTERNAL MEDICINE

## 2023-10-14 PROCEDURE — 3074F PR MOST RECENT SYSTOLIC BLOOD PRESSURE < 130 MM HG: ICD-10-PCS | Mod: CPTII,S$GLB,, | Performed by: INTERNAL MEDICINE

## 2023-10-14 PROCEDURE — 1159F PR MEDICATION LIST DOCUMENTED IN MEDICAL RECORD: ICD-10-PCS | Mod: CPTII,S$GLB,, | Performed by: INTERNAL MEDICINE

## 2023-10-14 PROCEDURE — 1160F PR REVIEW ALL MEDS BY PRESCRIBER/CLIN PHARMACIST DOCUMENTED: ICD-10-PCS | Mod: CPTII,S$GLB,, | Performed by: INTERNAL MEDICINE

## 2023-10-14 PROCEDURE — 99999 PR PBB SHADOW E&M-EST. PATIENT-LVL IV: ICD-10-PCS | Mod: PBBFAC,,, | Performed by: INTERNAL MEDICINE

## 2023-10-14 PROCEDURE — 3008F PR BODY MASS INDEX (BMI) DOCUMENTED: ICD-10-PCS | Mod: CPTII,S$GLB,, | Performed by: INTERNAL MEDICINE

## 2023-10-14 PROCEDURE — 99395 PR PREVENTIVE VISIT,EST,18-39: ICD-10-PCS | Mod: S$GLB,,, | Performed by: INTERNAL MEDICINE

## 2023-10-14 PROCEDURE — 1159F MED LIST DOCD IN RCRD: CPT | Mod: CPTII,S$GLB,, | Performed by: INTERNAL MEDICINE

## 2023-10-14 PROCEDURE — 3079F PR MOST RECENT DIASTOLIC BLOOD PRESSURE 80-89 MM HG: ICD-10-PCS | Mod: CPTII,S$GLB,, | Performed by: INTERNAL MEDICINE

## 2023-10-14 PROCEDURE — 3074F SYST BP LT 130 MM HG: CPT | Mod: CPTII,S$GLB,, | Performed by: INTERNAL MEDICINE

## 2023-10-14 NOTE — PROGRESS NOTES
[facility] Note    Chief Complaint      Chief Complaint   Patient presents with    Follow-up     Stiffness        History of Present Illness      Zara Iniguez is a 33 y.o. male who presents today for   Chief Complaint   Patient presents with    Follow-up     Stiffness    .   Here for annual preventative visit with me . He need sfull screening labs with this visit . He is currently very active with exercise and is eating healthy diet . He feels some stiffness with his exercise currently as he has not been playing as much basketball lately . He is getting back into his regular routine though    HPI    No problem-specific Assessment & Plan notes found for this encounter.       Problem List Items Addressed This Visit          ID    Encounter for hepatitis C screening test for low risk patient    Overview     Hep c          Encounter for screening for HIV    Overview     hiv screen             Other    Annual physical exam - Primary    Overview     pe documented needs full screening labs              Past Medical History:  Past Medical History:   Diagnosis Date    Adult congenital heart disease 11/11/2016    VSD (ventricular septal defect)- repaired 11/12/2016       Past Surgical History:  History reviewed. No pertinent surgical history.    Family History:  family history includes Diabetes in his maternal grandmother; No Known Problems in his brother, father, maternal aunt, maternal grandfather, maternal uncle, mother, paternal aunt, paternal grandfather, paternal grandmother, paternal uncle, and sister.     Social History:  Social History     Socioeconomic History    Marital status:    Tobacco Use    Smoking status: Never    Smokeless tobacco: Never   Substance and Sexual Activity    Alcohol use: Yes     Alcohol/week: 4.0 standard drinks of alcohol     Types: 1 Glasses of wine, 2 Shots of liquor, 1 Drinks containing 0.5 oz of alcohol per week     Comment: It's more bi-weekly     "Drug use: No    Sexual activity: Yes     Partners: Female     Birth control/protection: None       Review of Systems:   Review of Systems   HENT:  Negative for hearing loss.    Eyes:  Negative for discharge.   Respiratory:  Negative for wheezing.    Cardiovascular:  Positive for chest pain. Negative for palpitations.   Gastrointestinal:  Positive for blood in stool. Negative for constipation, diarrhea and vomiting.   Genitourinary:  Negative for hematuria and urgency.   Musculoskeletal:  Positive for neck pain.   Neurological:  Negative for weakness and headaches.   Endo/Heme/Allergies:  Negative for polydipsia.        Medications:  Outpatient Encounter Medications as of 10/14/2023   Medication Sig Dispense Refill    ibuprofen (ADVIL,MOTRIN) 800 MG tablet Take 1 tablet (800 mg total) by mouth every 6 (six) hours as needed for Pain. 60 tablet 2    LIDOcaine HCL 2% (XYLOCAINE) 2 % jelly Apply topically as needed. Apply topically once nightly to affected part of foot/feet. 30 mL 2    meloxicam (MOBIC) 15 MG tablet Take 1 tablet (15 mg total) by mouth once daily. 30 tablet 0    tobramycin sulfate 0.3% (TOBREX) 0.3 % ophthalmic solution 1-2 drops topically twice daily to affected toe(s). 5 mL 3     No facility-administered encounter medications on file as of 10/14/2023.       Allergies:  Review of patient's allergies indicates:  No Known Allergies      Physical Exam      Vitals:    10/14/23 1037   BP: 120/80   Pulse: 69   Resp: 18   Temp: 98 °F (36.7 °C)        Vital Signs  Temp: 98 °F (36.7 °C)  Temp Source: Oral  Pulse: 69  Resp: 18  SpO2: 97 %  BP: 120/80  BP Location: Right arm  Patient Position: Sitting  Pain Score: 0-No pain  Height and Weight  Height: 5' 10" (177.8 cm)  Weight: 91.4 kg (201 lb 8 oz)  BSA (Calculated - sq m): 2.12 sq meters  BMI (Calculated): 28.9  Weight in (lb) to have BMI = 25: 173.9]     Body mass index is 28.91 kg/m².    Physical Exam  Constitutional:       Appearance: He is well-developed. " "  HENT:      Head: Normocephalic.   Eyes:      Pupils: Pupils are equal, round, and reactive to light.   Neck:      Thyroid: No thyromegaly.   Cardiovascular:      Rate and Rhythm: Normal rate and regular rhythm.      Heart sounds: No murmur heard.     No friction rub. No gallop.   Pulmonary:      Effort: Pulmonary effort is normal.      Breath sounds: Normal breath sounds.   Abdominal:      General: Bowel sounds are normal.      Palpations: Abdomen is soft.   Musculoskeletal:         General: Normal range of motion.      Cervical back: Normal range of motion.   Skin:     General: Skin is warm and dry.   Neurological:      Mental Status: He is alert and oriented to person, place, and time.      Sensory: No sensory deficit.   Psychiatric:         Behavior: Behavior normal.          Laboratory:  CBC:  No results for input(s): "WBC", "RBC", "HGB", "HCT", "PLT", "MCV", "MCH", "MCHC" in the last 2160 hours.  CMP:  No results for input(s): "GLU", "CALCIUM", "ALBUMIN", "PROT", "NA", "K", "CO2", "CL", "BUN", "ALKPHOS", "ALT", "AST", "BILITOT" in the last 2160 hours.    Invalid input(s): "CREATININ"  URINALYSIS:  No results for input(s): "COLORU", "CLARITYU", "SPECGRAV", "PHUR", "PROTEINUA", "GLUCOSEU", "BILIRUBINCON", "BLOODU", "WBCU", "RBCU", "BACTERIA", "MUCUS", "NITRITE", "LEUKOCYTESUR", "UROBILINOGEN", "HYALINECASTS" in the last 2160 hours.   LIPIDS:  No results for input(s): "TSH", "HDL", "CHOL", "TRIG", "LDLCALC", "CHOLHDL", "NONHDLCHOL", "TOTALCHOLEST" in the last 2160 hours.  TSH:  No results for input(s): "TSH" in the last 2160 hours.  A1C:  No results for input(s): "HGBA1C" in the last 2160 hours.    Radiology:        Assessment:     Zara Iniguez is a 33 y.o.male with:    Annual physical exam  -     CBC Auto Differential; Future; Expected date: 10/14/2023  -     Comprehensive Metabolic Panel; Future; Expected date: 10/14/2023  -     Lipid Panel; Future; Expected date: 10/14/2023    Encounter for hepatitis C " screening test for low risk patient  -     Hepatitis C Antibody; Future; Expected date: 10/14/2023    Encounter for screening for HIV  -     HIV 1/2 Ag/Ab (4th Gen); Future; Expected date: 10/14/2023                Plan:     Problem List Items Addressed This Visit          ID    Encounter for hepatitis C screening test for low risk patient    Overview     Hep c          Encounter for screening for HIV    Overview     hiv screen             Other    Annual physical exam - Primary    Overview     pe documented needs full screening labs             As above, continue current medications and maintain follow up with specialists.  Return to clinic in 12 months.      Frederick W Dantagnan [facility]                  Answers submitted by the patient for this visit:  Review of Systems Questionnaire (Submitted on 10/14/2023)  activity change: Yes  unexpected weight change: No  rhinorrhea: No  trouble swallowing: No  visual disturbance: No  chest tightness: No  polyuria: No  difficulty urinating: No  joint swelling: No  arthralgias: Yes  confusion: No  dysphoric mood: No

## 2023-10-16 ENCOUNTER — LAB VISIT (OUTPATIENT)
Dept: LAB | Facility: HOSPITAL | Age: 33
End: 2023-10-16
Attending: INTERNAL MEDICINE
Payer: COMMERCIAL

## 2023-10-16 DIAGNOSIS — Z00.00 ANNUAL PHYSICAL EXAM: ICD-10-CM

## 2023-10-16 DIAGNOSIS — Z11.4 ENCOUNTER FOR SCREENING FOR HIV: ICD-10-CM

## 2023-10-16 DIAGNOSIS — Z11.59 ENCOUNTER FOR HEPATITIS C SCREENING TEST FOR LOW RISK PATIENT: ICD-10-CM

## 2023-10-16 LAB
ALBUMIN SERPL BCP-MCNC: 4.4 G/DL (ref 3.5–5.2)
ALP SERPL-CCNC: 52 U/L (ref 55–135)
ALT SERPL W/O P-5'-P-CCNC: 24 U/L (ref 10–44)
ANION GAP SERPL CALC-SCNC: 11 MMOL/L (ref 8–16)
AST SERPL-CCNC: 21 U/L (ref 10–40)
BASOPHILS # BLD AUTO: 0.04 K/UL (ref 0–0.2)
BASOPHILS NFR BLD: 0.8 % (ref 0–1.9)
BILIRUB SERPL-MCNC: 0.6 MG/DL (ref 0.1–1)
BUN SERPL-MCNC: 12 MG/DL (ref 6–20)
CALCIUM SERPL-MCNC: 9.3 MG/DL (ref 8.7–10.5)
CHLORIDE SERPL-SCNC: 103 MMOL/L (ref 95–110)
CHOLEST SERPL-MCNC: 165 MG/DL (ref 120–199)
CHOLEST/HDLC SERPL: 3.9 {RATIO} (ref 2–5)
CO2 SERPL-SCNC: 27 MMOL/L (ref 23–29)
CREAT SERPL-MCNC: 0.9 MG/DL (ref 0.5–1.4)
DIFFERENTIAL METHOD: ABNORMAL
EOSINOPHIL # BLD AUTO: 0 K/UL (ref 0–0.5)
EOSINOPHIL NFR BLD: 0.4 % (ref 0–8)
ERYTHROCYTE [DISTWIDTH] IN BLOOD BY AUTOMATED COUNT: 13.3 % (ref 11.5–14.5)
EST. GFR  (NO RACE VARIABLE): >60 ML/MIN/1.73 M^2
GLUCOSE SERPL-MCNC: 83 MG/DL (ref 70–110)
HCT VFR BLD AUTO: 44.9 % (ref 40–54)
HCV AB SERPL QL IA: NORMAL
HDLC SERPL-MCNC: 42 MG/DL (ref 40–75)
HDLC SERPL: 25.5 % (ref 20–50)
HGB BLD-MCNC: 14.3 G/DL (ref 14–18)
HIV 1+2 AB+HIV1 P24 AG SERPL QL IA: NORMAL
IMM GRANULOCYTES # BLD AUTO: 0.01 K/UL (ref 0–0.04)
IMM GRANULOCYTES NFR BLD AUTO: 0.2 % (ref 0–0.5)
LDLC SERPL CALC-MCNC: 107.4 MG/DL (ref 63–159)
LYMPHOCYTES # BLD AUTO: 1.7 K/UL (ref 1–4.8)
LYMPHOCYTES NFR BLD: 32.9 % (ref 18–48)
MCH RBC QN AUTO: 30.1 PG (ref 27–31)
MCHC RBC AUTO-ENTMCNC: 31.8 G/DL (ref 32–36)
MCV RBC AUTO: 95 FL (ref 82–98)
MONOCYTES # BLD AUTO: 0.5 K/UL (ref 0.3–1)
MONOCYTES NFR BLD: 9.9 % (ref 4–15)
NEUTROPHILS # BLD AUTO: 2.9 K/UL (ref 1.8–7.7)
NEUTROPHILS NFR BLD: 55.8 % (ref 38–73)
NONHDLC SERPL-MCNC: 123 MG/DL
NRBC BLD-RTO: 0 /100 WBC
PLATELET # BLD AUTO: 221 K/UL (ref 150–450)
PMV BLD AUTO: 11.9 FL (ref 9.2–12.9)
POTASSIUM SERPL-SCNC: 4.5 MMOL/L (ref 3.5–5.1)
PROT SERPL-MCNC: 7.8 G/DL (ref 6–8.4)
RBC # BLD AUTO: 4.75 M/UL (ref 4.6–6.2)
SODIUM SERPL-SCNC: 141 MMOL/L (ref 136–145)
TRIGL SERPL-MCNC: 78 MG/DL (ref 30–150)
WBC # BLD AUTO: 5.26 K/UL (ref 3.9–12.7)

## 2023-10-16 PROCEDURE — 36415 COLL VENOUS BLD VENIPUNCTURE: CPT | Performed by: INTERNAL MEDICINE

## 2023-10-16 PROCEDURE — 86803 HEPATITIS C AB TEST: CPT | Performed by: INTERNAL MEDICINE

## 2023-10-16 PROCEDURE — 87389 HIV-1 AG W/HIV-1&-2 AB AG IA: CPT | Performed by: INTERNAL MEDICINE

## 2023-10-16 PROCEDURE — 85025 COMPLETE CBC W/AUTO DIFF WBC: CPT | Performed by: INTERNAL MEDICINE

## 2023-10-16 PROCEDURE — 80061 LIPID PANEL: CPT | Performed by: INTERNAL MEDICINE

## 2023-10-16 PROCEDURE — 80053 COMPREHEN METABOLIC PANEL: CPT | Performed by: INTERNAL MEDICINE

## 2024-01-15 PROBLEM — Z00.00 ANNUAL PHYSICAL EXAM: Status: RESOLVED | Noted: 2023-10-14 | Resolved: 2024-01-15

## 2024-07-19 ENCOUNTER — OFFICE VISIT (OUTPATIENT)
Dept: ORTHOPEDICS | Facility: CLINIC | Age: 34
End: 2024-07-19
Payer: COMMERCIAL

## 2024-07-19 DIAGNOSIS — M19.011 ARTHRITIS OF RIGHT ACROMIOCLAVICULAR JOINT: Primary | ICD-10-CM

## 2024-07-19 DIAGNOSIS — M25.511 RIGHT SHOULDER PAIN, UNSPECIFIED CHRONICITY: Primary | ICD-10-CM

## 2024-07-19 PROCEDURE — 99999 PR PBB SHADOW E&M-EST. PATIENT-LVL II: CPT | Mod: PBBFAC,,, | Performed by: ORTHOPAEDIC SURGERY

## 2024-07-19 PROCEDURE — 99214 OFFICE O/P EST MOD 30 MIN: CPT | Mod: S$GLB,,, | Performed by: ORTHOPAEDIC SURGERY

## 2024-07-19 PROCEDURE — 1159F MED LIST DOCD IN RCRD: CPT | Mod: CPTII,S$GLB,, | Performed by: ORTHOPAEDIC SURGERY

## 2024-07-19 RX ORDER — MELOXICAM 15 MG/1
15 TABLET ORAL DAILY
Qty: 30 TABLET | Refills: 0 | Status: SHIPPED | OUTPATIENT
Start: 2024-07-19

## 2024-07-19 NOTE — PROGRESS NOTES
Assessment: 34 y.o. male with right AC arthralgia     I explained my diagnostic impression and the reasoning behind it in detail, using layman's terms.       Plan:   -  Mobic 15 mg PO QD x 2 weeks then PRN. The patient was advised that NSAID-type medications have important potential side effects: gastrointestinal irritation, GI bleeding, cardiac effects and renal injuries. Take the medication with food and to stop and call the office for any GI upset, vomiting, abdominal pain or black/bloody stools. The patient expresses understanding of these issues and questions were answered.  - If no improvement can consider injection     All questions were answered in detail. The patient is in full agreement with the treatment plan and will proceed accordingly.    Chief Complaint   Patient presents with    Right Shoulder - Pain       Initial visit (7/19/24): Zara Iniguez is a 34 y.o. male who presents today complaining of right shoulder pain  Duration of symptoms:  about 1 month   Trauma or new activity: no  Pain is intermittent  Localizes pain to subdeltoid fossa   Aggravating factors: Pain with chest press, fly at the gym. Has had to drop weight because of this. Pain with crossbody reaching   Relieving factors: rest  Night pain is present and is disruptive to sleep  Radicular symptoms: no numbness, paresthesias   Prior treatment:  Activity modification (avoiding weights in gym) has continued to do pushups  Has not tried medications   Pain does interfere with working out  and activities of daily living .     This is the extent of the patient's complaints at this time.     Hand dominance: Left     Occupation:Works at [employer], does not have to do heavy lifting       Review of patient's allergies indicates:  No Known Allergies      Physical Exam:   Vitals:    07/19/24 1435   PainSc:   4   PainLoc: Shoulder       General: Patient is alert, awake and oriented to time, place and person. Mood and affect are appropriate.   Patient does not appear to be in any distress, denies any constitutional symptoms and appears stated age.   HEENT: Pupils are equal and round, sclera are not injected. External examination of ears and nose reveals no abnormalities. Cranial nerves II-X are grossly intact  Skin: no rashes, abrasions or open wounds on the affected extremity   Resp: No respiratory distress or audible wheezing   CV: 2+  pulses, all extremities warm and well perfused   Right Shoulder    Shoulder Range of Motion    Right     Left   (Active/Passive)       Forward Elevation     170/170            170/170   External rotation (arm at side)  60/60             60/60   Internal rotation behind the back  T12             T12     Range of motion is not painful     Acromioclavicular joint is mildly tender  Crossbody test: positive    Neer's negative  Hawkin's negative    Zeferino's positive  Drop arm negative  Belly press negative      Cuff Strength     Right     Left   Supraspinatus        5/5    5/5  Infraspinatus     5/5    5/5  Subscapularis     5/5    5/5    Deltoid testing            5/5    5/5    Speeds negative  Yergasons negative    Elbow examination demonstrates no tenderness to palpation and has normal range of motion.     ltsi C5-T1  + epl, io, fds, fdp   2+ RP      Imaging:  3 views of the right shoulder:  negative for degenerative changes of the AC joint. The humeral head is well centered on the AP and axillary views.  No cortical changes of the greater tuberosity or glanoid . There is not significant degenerative change of the glenohumeral joint or posterior subluxation of the humeral head. No acute changes or fracture.      I personally reviewed and interpreted the patient's imaging obtained today in clinic        This note was created by combination of typed  and M-Modal dictation. Transcription and phonetic errors may be present.  If there are any questions, please contact me.      Current Outpatient Medications:     ibuprofen  (ADVIL,MOTRIN) 800 MG tablet, Take 1 tablet (800 mg total) by mouth every 6 (six) hours as needed for Pain., Disp: 60 tablet, Rfl: 2    LIDOcaine HCL 2% (XYLOCAINE) 2 % jelly, Apply topically as needed. Apply topically once nightly to affected part of foot/feet., Disp: 30 mL, Rfl: 2    meloxicam (MOBIC) 15 MG tablet, Take 1 tablet (15 mg total) by mouth once daily., Disp: 30 tablet, Rfl: 0    tobramycin sulfate 0.3% (TOBREX) 0.3 % ophthalmic solution, 1-2 drops topically twice daily to affected toe(s)., Disp: 5 mL, Rfl: 3    Past Medical History:   Diagnosis Date    Adult congenital heart disease 11/11/2016    VSD (ventricular septal defect)- repaired 11/12/2016       Active Problem List with Overview Notes    Diagnosis Date Noted    Encounter for hepatitis C screening test for low risk patient 10/14/2023     Hep c       Encounter for screening for HIV 10/14/2023     hiv screen       Cervical paraspinous muscle spasm 06/21/2022     meloxicam 15 mg po daily disp 30  Cyclobenzaprine 10 mg po tid disp 15       AV canal 11/18/2018    Plantar fasciitis of right foot 10/24/2018    Adult congenital heart disease 11/11/2016     Cards managing          History reviewed. No pertinent surgical history.    Social History     Socioeconomic History    Marital status:    Tobacco Use    Smoking status: Never    Smokeless tobacco: Never   Substance and Sexual Activity    Alcohol use: Yes     Alcohol/week: 4.0 standard drinks of alcohol     Types: 1 Glasses of wine, 2 Shots of liquor, 1 Drinks containing 0.5 oz of alcohol per week     Comment: It's more bi-weekly    Drug use: No    Sexual activity: Yes     Partners: Female     Birth control/protection: None     Social Determinants of Health     Financial Resource Strain: Low Risk  (7/19/2024)    Overall Financial Resource Strain (CARDIA)     Difficulty of Paying Living Expenses: Not very hard   Food Insecurity: No Food Insecurity (7/19/2024)    Hunger Vital Sign     Worried  About Running Out of Food in the Last Year: Never true     Ran Out of Food in the Last Year: Never true   Physical Activity: Sufficiently Active (7/19/2024)    Exercise Vital Sign     Days of Exercise per Week: 5 days     Minutes of Exercise per Session: 90 min   Stress: Stress Concern Present (7/19/2024)    [place] [employer] - Occupational Stress Questionnaire     Feeling of Stress : To some extent   Housing Stability: High Risk (7/19/2024)    Housing Stability Vital Sign     Unable to Pay for Housing in the Last Year: Yes

## 2024-07-19 NOTE — PROGRESS NOTES
Assessment: 34 y.o. male with right {lglshoulderdiagnoses:85443}    I explained my diagnostic impression and the reasoning behind it in detail, using layman's terms.  Models and/or pictures were used to help in the explanation.  We discussed non operative and operative treatment modalities -- {HE/SHE:38957} would like to start with {lgltreatment:74774} treatment in the form of ***.     Plan:   - ***  - Return to clinic in {NUMBERS 1-20:12800} {DAYS, WEEKS ,MONTHS:21894}. Return sooner if symptoms worsen or fail to improve.      All questions were answered in detail. The patient is in full agreement with the treatment plan and will proceed accordingly.    No chief complaint on file.      Initial visit (***): Zara Iniguez is a 34 y.o. male who presents today complaining of {RIGHT/LEFT/BILATERAL:57924} shoulder pain  Duration of symptoms:  ***{DAYS, WEEKS ,MONTHS:21894}  Trauma or new activity: {YES/NO:20890}  Pain is {Constant/Intermittent:59330}   ***/10 at best and ***/10 at its worst  Aggravating factors: ***  Relieving factors: ***  Night pain {lglnightpain:38698}  Radicular symptoms: {YES/NO:13731} {Radiculopathy symptoms:97217}   Associated symptoms:  {pain associated symptoms:72652}.    Prior treatment:  {prior treatment :14560} {WITH-WITHOUT:88340} improvement in {lgl pain swelling range of motion:79884}.     Pain {DOES_DOES NOT:24319} interfere with {activities :58480}.    This patient was seen in consultation at the request of No ref. provider found    This is the extent of the patient's complaints at this time.     Hand dominance: {Desc; Left/Right:58620}     Occupation:***    ROS ***     Review of patient's allergies indicates:  No Known Allergies      Physical Exam:   There were no vitals filed for this visit.    General:   There is no height or weight on file to calculate BMI.  Patient is alert, awake and oriented to time, place and person. Mood and affect are appropriate.  Patient does not appear  to be in any distress, denies any constitutional symptoms and appears stated age.   HEENT: Pupils are equal and round, sclera are not injected. External examination of ears and nose reveals no abnormalities. Cranial nerves II-X are grossly intact  Neck:*** examination demonstrates painless *** active range of motion. Spurling's sign is {NEGATIVE/POSITIVE:04009}  Skin: no rashes, abrasions or open wounds on the affected extremity   Resp: No respiratory distress or audible wheezing   CV: 2+ *** pulses, all extremities warm and well perfused   {Desc; Left/Right:42381} Shoulder    Shoulder Range of Motion    Right     Left   (Active/Passive)       Forward Elevation     ***/***             ***/***  External rotation (abduction)   ***/***             ***/***   External rotation (arm at side)  ***/***             ***/***   Internal rotation in abduction   ***/***                        ***/***   Internal rotation behind the back  ***             ***     Range of motion {IS / IS NOT:09590} painful     Scapular winging***  Scapular dyskinesia ***    Examination of the back shows atrophy of ***    Acromioclavicular joint {IS / IS NOT:26757} tender  Crossbody test: {NEGATIVE/POSITIVE:35244}    Neer's {NEGATIVE/POSITIVE:30979}  Hawkin's {NEGATIVE/POSITIVE:70331}    Zeferino's {NEGATIVE/POSITIVE:84848}  Drop arm {NEGATIVE/POSITIVE:00925}  Belly press {NEGATIVE/POSITIVE:12725}  Liftoff {NEGATIVE/POSITIVE:00644}  External rotation lag sign {NEGATIVE/POSITIVE:20589}  Hornblowers sign {NEGATIVE/POSITIVE:35021}  Internal rotation lag sign {NEGATIVE/POSITIVE:59503}    Cuff Strength     Right     Left   Supraspinatus        ***/5    ***/5  Infraspinatus     ***/5    ***/5  Subscapularis     ***/5    ***/5    Deltoid testing            ***/5    ***/5    Muro's test {NEGATIVE/POSITIVE:67159}  Speeds {NEGATIVE/POSITIVE:85140}  Yergasons {NEGATIVE/POSITIVE:68296}    Anterior instability testing:  Apprehension and relocation  "{NEGATIVE/POSITIVE:33016}  Anterior load and shift {NEGATIVE/POSITIVE:73884}  ***+    Posterior instability:   Kims test {NEGATIVE/POSITIVE:27385}  Posterior load and shift ***+  Posterior Stress {NEGATIVE/POSITIVE:69789}    Sulcus sign ***+  Labral shear test {NEGATIVE/POSITIVE:49856}  Signs of hyperlaxity: Beighton score ***/9   Exam positive for: {LGLHyperlaxity:53129:s}    Elbow examination demonstrates no tenderness to palpation and has normal range of motion.     ***ltsi C5-T1  *** epl, io, fds, fdp   ***+ RP      Imaging: *** 3 views of the {Right/left:26721} shoulder:  {Exam:::"negative"} for degenerative changes of the AC joint. The humeral head {IS / IS NOT:79952} well centered on the AP and axillary views.  There is calcification at the rotator cuff insertion on the greater tuberosity. There {IS / IS NOT:82524} significant degenerative change of the glenohumeral joint or posterior subluxation of the humeral head. No acute changes or fracture.      I personally reviewed and interpreted the patient's imaging obtained {lglimagingtimin}     A note notifying No ref. provider found of my findings was sent via the electronic medical record     This note was created by combination of typed  and M-Modal dictation. Transcription and phonetic errors may be present.  If there are any questions, please contact me.      Current Outpatient Medications:     ibuprofen (ADVIL,MOTRIN) 800 MG tablet, Take 1 tablet (800 mg total) by mouth every 6 (six) hours as needed for Pain., Disp: 60 tablet, Rfl: 2    LIDOcaine HCL 2% (XYLOCAINE) 2 % jelly, Apply topically as needed. Apply topically once nightly to affected part of foot/feet., Disp: 30 mL, Rfl: 2    meloxicam (MOBIC) 15 MG tablet, Take 1 tablet (15 mg total) by mouth once daily., Disp: 30 tablet, Rfl: 0    tobramycin sulfate 0.3% (TOBREX) 0.3 % ophthalmic solution, 1-2 drops topically twice daily to affected toe(s)., Disp: 5 mL, Rfl: 3    Past " Medical History:   Diagnosis Date    Adult congenital heart disease 11/11/2016    VSD (ventricular septal defect)- repaired 11/12/2016       Active Problem List with Overview Notes    Diagnosis Date Noted    Encounter for hepatitis C screening test for low risk patient 10/14/2023     Hep c       Encounter for screening for HIV 10/14/2023     hiv screen       Cervical paraspinous muscle spasm 06/21/2022     meloxicam 15 mg po daily disp 30  Cyclobenzaprine 10 mg po tid disp 15       AV canal 11/18/2018    Plantar fasciitis of right foot 10/24/2018    Adult congenital heart disease 11/11/2016     Cards managing          No past surgical history on file.    Social History     Socioeconomic History    Marital status:    Tobacco Use    Smoking status: Never    Smokeless tobacco: Never   Substance and Sexual Activity    Alcohol use: Yes     Alcohol/week: 4.0 standard drinks of alcohol     Types: 1 Glasses of wine, 2 Shots of liquor, 1 Drinks containing 0.5 oz of alcohol per week     Comment: It's more bi-weekly    Drug use: No    Sexual activity: Yes     Partners: Female     Birth control/protection: None     Social Determinants of Health     Financial Resource Strain: Low Risk  (7/19/2024)    Overall Financial Resource Strain (CARDIA)     Difficulty of Paying Living Expenses: Not very hard   Food Insecurity: No Food Insecurity (7/19/2024)    Hunger Vital Sign     Worried About Running Out of Food in the Last Year: Never true     Ran Out of Food in the Last Year: Never true   Physical Activity: Sufficiently Active (7/19/2024)    Exercise Vital Sign     Days of Exercise per Week: 5 days     Minutes of Exercise per Session: 90 min   Stress: Stress Concern Present (7/19/2024)    Citizen of [place] of Occupational Health - Occupational Stress Questionnaire     Feeling of Stress : To some extent   Housing Stability: High Risk (7/19/2024)    Housing Stability Vital Sign     Unable to Pay for Housing in the Last Year: Yes